# Patient Record
Sex: MALE | Race: WHITE | NOT HISPANIC OR LATINO | URBAN - METROPOLITAN AREA
[De-identification: names, ages, dates, MRNs, and addresses within clinical notes are randomized per-mention and may not be internally consistent; named-entity substitution may affect disease eponyms.]

---

## 2017-08-24 ENCOUNTER — COMMUNICATION - HEALTHEAST (OUTPATIENT)
Dept: INTERNAL MEDICINE | Facility: CLINIC | Age: 47
End: 2017-08-24

## 2017-08-25 ENCOUNTER — RECORDS - HEALTHEAST (OUTPATIENT)
Dept: ADMINISTRATIVE | Facility: OTHER | Age: 47
End: 2017-08-25

## 2017-08-25 ENCOUNTER — COMMUNICATION - HEALTHEAST (OUTPATIENT)
Dept: INTERNAL MEDICINE | Facility: CLINIC | Age: 47
End: 2017-08-25

## 2017-11-22 ENCOUNTER — COMMUNICATION - HEALTHEAST (OUTPATIENT)
Dept: INTERNAL MEDICINE | Facility: CLINIC | Age: 47
End: 2017-11-22

## 2017-11-27 ENCOUNTER — COMMUNICATION - HEALTHEAST (OUTPATIENT)
Dept: INTERNAL MEDICINE | Facility: CLINIC | Age: 47
End: 2017-11-27

## 2017-11-29 ENCOUNTER — COMMUNICATION - HEALTHEAST (OUTPATIENT)
Dept: INTERNAL MEDICINE | Facility: CLINIC | Age: 47
End: 2017-11-29

## 2017-12-05 ENCOUNTER — COMMUNICATION - HEALTHEAST (OUTPATIENT)
Dept: INTERNAL MEDICINE | Facility: CLINIC | Age: 47
End: 2017-12-05

## 2017-12-05 DIAGNOSIS — I10 ESSENTIAL HYPERTENSION: ICD-10-CM

## 2018-04-06 ENCOUNTER — OFFICE VISIT - HEALTHEAST (OUTPATIENT)
Dept: FAMILY MEDICINE | Facility: CLINIC | Age: 48
End: 2018-04-06

## 2018-04-06 DIAGNOSIS — Z00.00 ROUTINE GENERAL MEDICAL EXAMINATION AT A HEALTH CARE FACILITY: ICD-10-CM

## 2018-04-06 DIAGNOSIS — M72.2 PLANTAR FASCIITIS: ICD-10-CM

## 2018-04-06 DIAGNOSIS — I10 ESSENTIAL HYPERTENSION: ICD-10-CM

## 2018-04-06 DIAGNOSIS — F43.10 PTSD (POST-TRAUMATIC STRESS DISORDER): ICD-10-CM

## 2018-04-06 DIAGNOSIS — G47.33 SLEEP APNEA, OBSTRUCTIVE: ICD-10-CM

## 2018-04-06 DIAGNOSIS — L91.8 MULTIPLE ACQUIRED SKIN TAGS: ICD-10-CM

## 2018-04-06 DIAGNOSIS — E78.5 HYPERLIPIDEMIA: ICD-10-CM

## 2018-04-06 RX ORDER — POTASSIUM CHLORIDE 1500 MG/1
20 TABLET, EXTENDED RELEASE ORAL DAILY
Qty: 90 TABLET | Refills: 3 | Status: SHIPPED | OUTPATIENT
Start: 2018-04-06

## 2018-04-06 RX ORDER — ASPIRIN 325 MG
325 TABLET ORAL DAILY
Status: SHIPPED | COMMUNITY
Start: 2018-04-06

## 2018-04-06 RX ORDER — BUPROPION HYDROCHLORIDE 150 MG/1
150 TABLET ORAL EVERY MORNING
Qty: 90 TABLET | Refills: 3 | Status: SHIPPED | OUTPATIENT
Start: 2018-04-06

## 2018-04-06 ASSESSMENT — MIFFLIN-ST. JEOR: SCORE: 2098.9

## 2018-04-06 NOTE — ASSESSMENT & PLAN NOTE
Well-controlled.  Continue losartan daily.  The patient uses Lasix 40 mg as needed for swelling.  I have recommended against as needed use of a diuretic due to electrolyte concerns.  We will change to 40 mg daily.  He reports that he has needed potassium 20 mEq daily when he takes his Lasix so that was started as well.  Return for fasting labs in 1 week.

## 2018-04-06 NOTE — ASSESSMENT & PLAN NOTE
Reviewed the importance of using his CPAP.  He reports he just does not tolerate it and will not use it.  Recommended follow-up with the sleep medicine provider but he has refused that at this time.

## 2018-04-10 ENCOUNTER — AMBULATORY - HEALTHEAST (OUTPATIENT)
Dept: FAMILY MEDICINE | Facility: CLINIC | Age: 48
End: 2018-04-10

## 2018-04-10 DIAGNOSIS — L91.8 SKIN TAGS, MULTIPLE ACQUIRED: ICD-10-CM

## 2018-04-10 ASSESSMENT — MIFFLIN-ST. JEOR: SCORE: 2098.9

## 2018-04-16 LAB
LAB AP CHARGES (HE HISTORICAL CONVERSION): NORMAL
PATH REPORT.COMMENTS IMP SPEC: NORMAL
PATH REPORT.COMMENTS IMP SPEC: NORMAL
PATH REPORT.FINAL DX SPEC: NORMAL
PATH REPORT.GROSS SPEC: NORMAL
PATH REPORT.MICROSCOPIC SPEC OTHER STN: NORMAL
PATH REPORT.RELEVANT HX SPEC: NORMAL
RESULT FLAG (HE HISTORICAL CONVERSION): NORMAL

## 2018-08-28 ENCOUNTER — AMBULATORY - HEALTHEAST (OUTPATIENT)
Dept: LAB | Facility: CLINIC | Age: 48
End: 2018-08-28

## 2018-08-28 DIAGNOSIS — E78.5 HYPERLIPIDEMIA: ICD-10-CM

## 2018-08-28 DIAGNOSIS — I10 ESSENTIAL HYPERTENSION: ICD-10-CM

## 2018-08-29 ENCOUNTER — AMBULATORY - HEALTHEAST (OUTPATIENT)
Dept: LAB | Facility: CLINIC | Age: 48
End: 2018-08-29

## 2018-08-29 DIAGNOSIS — E78.5 HYPERLIPIDEMIA: ICD-10-CM

## 2018-08-29 DIAGNOSIS — R53.83 FATIGUE: ICD-10-CM

## 2018-08-29 DIAGNOSIS — I10 ESSENTIAL HYPERTENSION: ICD-10-CM

## 2018-08-29 LAB
ALBUMIN SERPL-MCNC: 3.7 G/DL (ref 3.5–5)
ALP SERPL-CCNC: 66 U/L (ref 45–120)
ALT SERPL W P-5'-P-CCNC: 51 U/L (ref 0–45)
ANION GAP SERPL CALCULATED.3IONS-SCNC: 10 MMOL/L (ref 5–18)
AST SERPL W P-5'-P-CCNC: 44 U/L (ref 0–40)
BILIRUB SERPL-MCNC: 1.8 MG/DL (ref 0–1)
BUN SERPL-MCNC: 14 MG/DL (ref 8–22)
CALCIUM SERPL-MCNC: 9.4 MG/DL (ref 8.5–10.5)
CHLORIDE BLD-SCNC: 108 MMOL/L (ref 98–107)
CHOLEST SERPL-MCNC: 227 MG/DL
CO2 SERPL-SCNC: 21 MMOL/L (ref 22–31)
CREAT SERPL-MCNC: 0.79 MG/DL (ref 0.7–1.3)
FASTING STATUS PATIENT QL REPORTED: YES
GFR SERPL CREATININE-BSD FRML MDRD: >60 ML/MIN/1.73M2
GLUCOSE BLD-MCNC: 119 MG/DL (ref 70–125)
HBA1C MFR BLD: 6.2 % (ref 3.5–6)
HDLC SERPL-MCNC: 40 MG/DL
LDLC SERPL CALC-MCNC: 142 MG/DL
POTASSIUM BLD-SCNC: 4.2 MMOL/L (ref 3.5–5)
PROT SERPL-MCNC: 7.1 G/DL (ref 6–8)
SODIUM SERPL-SCNC: 139 MMOL/L (ref 136–145)
TRIGL SERPL-MCNC: 226 MG/DL

## 2018-08-30 ENCOUNTER — COMMUNICATION - HEALTHEAST (OUTPATIENT)
Dept: FAMILY MEDICINE | Facility: CLINIC | Age: 48
End: 2018-08-30

## 2018-08-30 DIAGNOSIS — R53.83 FATIGUE: ICD-10-CM

## 2018-09-03 LAB — TESTOST SERPL-MCNC: 143 NG/DL (ref 240–871)

## 2018-09-04 ENCOUNTER — COMMUNICATION - HEALTHEAST (OUTPATIENT)
Dept: FAMILY MEDICINE | Facility: CLINIC | Age: 48
End: 2018-09-04

## 2018-09-04 DIAGNOSIS — R79.89 LOW SERUM TESTOSTERONE LEVEL IN MALE: ICD-10-CM

## 2018-09-04 DIAGNOSIS — R79.89 LOW TESTOSTERONE IN MALE: ICD-10-CM

## 2018-09-05 ENCOUNTER — COMMUNICATION - HEALTHEAST (OUTPATIENT)
Dept: ADMINISTRATIVE | Facility: CLINIC | Age: 48
End: 2018-09-05

## 2018-09-10 ENCOUNTER — COMMUNICATION - HEALTHEAST (OUTPATIENT)
Dept: FAMILY MEDICINE | Facility: CLINIC | Age: 48
End: 2018-09-10

## 2018-09-14 ENCOUNTER — OFFICE VISIT - HEALTHEAST (OUTPATIENT)
Dept: FAMILY MEDICINE | Facility: CLINIC | Age: 48
End: 2018-09-14

## 2018-09-14 DIAGNOSIS — R79.89 LOW TESTOSTERONE IN MALE: ICD-10-CM

## 2018-09-14 DIAGNOSIS — I10 ESSENTIAL HYPERTENSION: ICD-10-CM

## 2018-09-14 DIAGNOSIS — E78.2 MIXED HYPERLIPIDEMIA: ICD-10-CM

## 2018-09-14 DIAGNOSIS — R74.8 ELEVATED LIVER ENZYMES: ICD-10-CM

## 2018-09-14 DIAGNOSIS — E66.01 MORBID OBESITY (H): ICD-10-CM

## 2018-09-14 DIAGNOSIS — R73.03 PREDIABETES: ICD-10-CM

## 2018-09-14 ASSESSMENT — MIFFLIN-ST. JEOR: SCORE: 2103.44

## 2018-09-14 NOTE — ASSESSMENT & PLAN NOTE
Blood pressure have been well controlled at home. Pressure was borderline when he first arrived today and high after discussion of normal labs. I suspect part of this is related to the stress of our discussion. Continue current treatment at this time. The patient is working hard on weight loss, exercise, and diet changes. Recheck in 3 months.

## 2018-09-14 NOTE — ASSESSMENT & PLAN NOTE
The patient has been referred to endocrinology for evaluation. We discussed that some of the lifestyle changes he is already working on may also improve his testosterone levels.

## 2018-09-14 NOTE — ASSESSMENT & PLAN NOTE
ASCVD 10 year risk is 9%. This was reviewed with patient and statin was recommended. Patient is refusing medications at this time. He is going to continue with lifestyle and return for fasting labs/office visit in 3 months.

## 2018-09-17 ENCOUNTER — HOSPITAL ENCOUNTER (OUTPATIENT)
Dept: ULTRASOUND IMAGING | Facility: CLINIC | Age: 48
Discharge: HOME | End: 2018-09-17
Attending: FAMILY MEDICINE

## 2018-09-17 DIAGNOSIS — R74.8 ELEVATED LIVER ENZYMES: ICD-10-CM

## 2018-12-03 ENCOUNTER — COMMUNICATION - HEALTHEAST (OUTPATIENT)
Dept: FAMILY MEDICINE | Facility: CLINIC | Age: 48
End: 2018-12-03

## 2018-12-03 DIAGNOSIS — L30.9 ECZEMA, UNSPECIFIED TYPE: ICD-10-CM

## 2018-12-03 RX ORDER — TRIAMCINOLONE ACETONIDE 0.25 MG/G
CREAM TOPICAL
Qty: 30 G | Refills: 1 | Status: SHIPPED | OUTPATIENT
Start: 2018-12-03

## 2018-12-13 ENCOUNTER — AMBULATORY - HEALTHEAST (OUTPATIENT)
Dept: LAB | Facility: CLINIC | Age: 48
End: 2018-12-13

## 2018-12-13 DIAGNOSIS — R73.03 PREDIABETES: ICD-10-CM

## 2018-12-13 DIAGNOSIS — R79.89 LOW TESTOSTERONE IN MALE: ICD-10-CM

## 2018-12-13 LAB
ALBUMIN SERPL-MCNC: 3.8 G/DL (ref 3.5–5)
ALP SERPL-CCNC: 67 U/L (ref 45–120)
ALT SERPL W P-5'-P-CCNC: 27 U/L (ref 0–45)
ANION GAP SERPL CALCULATED.3IONS-SCNC: 9 MMOL/L (ref 5–18)
AST SERPL W P-5'-P-CCNC: 30 U/L (ref 0–40)
BILIRUB SERPL-MCNC: 1.2 MG/DL (ref 0–1)
BUN SERPL-MCNC: 24 MG/DL (ref 8–22)
CALCIUM SERPL-MCNC: 9.4 MG/DL (ref 8.5–10.5)
CHLORIDE BLD-SCNC: 107 MMOL/L (ref 98–107)
CHOLEST SERPL-MCNC: 192 MG/DL
CO2 SERPL-SCNC: 26 MMOL/L (ref 22–31)
CREAT SERPL-MCNC: 0.95 MG/DL (ref 0.7–1.3)
FASTING STATUS PATIENT QL REPORTED: YES
GFR SERPL CREATININE-BSD FRML MDRD: >60 ML/MIN/1.73M2
GLUCOSE BLD-MCNC: 116 MG/DL (ref 70–125)
HBA1C MFR BLD: 5.7 % (ref 3.5–6)
HDLC SERPL-MCNC: 35 MG/DL
LDLC SERPL CALC-MCNC: 102 MG/DL
POTASSIUM BLD-SCNC: 3.9 MMOL/L (ref 3.5–5)
PROT SERPL-MCNC: 6.7 G/DL (ref 6–8)
SODIUM SERPL-SCNC: 142 MMOL/L (ref 136–145)
TRIGL SERPL-MCNC: 273 MG/DL

## 2018-12-14 LAB — TESTOST SERPL-MCNC: 216 NG/DL (ref 240–871)

## 2018-12-20 ENCOUNTER — OFFICE VISIT - HEALTHEAST (OUTPATIENT)
Dept: FAMILY MEDICINE | Facility: CLINIC | Age: 48
End: 2018-12-20

## 2018-12-20 DIAGNOSIS — E66.01 MORBID OBESITY (H): ICD-10-CM

## 2018-12-20 DIAGNOSIS — I10 ESSENTIAL HYPERTENSION: ICD-10-CM

## 2018-12-20 DIAGNOSIS — R79.89 LOW TESTOSTERONE IN MALE: ICD-10-CM

## 2018-12-20 DIAGNOSIS — E78.2 MIXED HYPERLIPIDEMIA: ICD-10-CM

## 2018-12-20 DIAGNOSIS — R73.03 PREDIABETES: ICD-10-CM

## 2018-12-20 NOTE — ASSESSMENT & PLAN NOTE
The patient has had significant improvement with weight loss and exercise.  Still slightly low but nearing normal.  He will continue with current life style changes.

## 2018-12-20 NOTE — ASSESSMENT & PLAN NOTE
ASCVD 10-year risk: 4%.  This is down from 9% at his last visit with no medications.  He will continue with current lifestyle changes.  Recheck in 1 year.

## 2019-01-25 ENCOUNTER — OFFICE VISIT - HEALTHEAST (OUTPATIENT)
Dept: ENDOCRINOLOGY | Facility: CLINIC | Age: 49
End: 2019-01-25

## 2019-01-25 DIAGNOSIS — E66.811 CLASS 1 OBESITY DUE TO EXCESS CALORIES WITH BODY MASS INDEX (BMI) OF 34.0 TO 34.9 IN ADULT, UNSPECIFIED WHETHER SERIOUS COMORBIDITY PRESENT: ICD-10-CM

## 2019-01-25 DIAGNOSIS — E66.09 CLASS 1 OBESITY DUE TO EXCESS CALORIES WITH BODY MASS INDEX (BMI) OF 34.0 TO 34.9 IN ADULT, UNSPECIFIED WHETHER SERIOUS COMORBIDITY PRESENT: ICD-10-CM

## 2019-01-25 DIAGNOSIS — R79.89 LOW TESTOSTERONE IN MALE: ICD-10-CM

## 2019-01-25 ASSESSMENT — MIFFLIN-ST. JEOR: SCORE: 1996.39

## 2019-07-18 ENCOUNTER — COMMUNICATION - HEALTHEAST (OUTPATIENT)
Dept: FAMILY MEDICINE | Facility: CLINIC | Age: 49
End: 2019-07-18

## 2019-07-22 ENCOUNTER — OFFICE VISIT - HEALTHEAST (OUTPATIENT)
Dept: FAMILY MEDICINE | Facility: CLINIC | Age: 49
End: 2019-07-22

## 2019-07-22 DIAGNOSIS — G89.29 CHRONIC RIGHT SHOULDER PAIN: ICD-10-CM

## 2019-07-22 DIAGNOSIS — M25.511 CHRONIC RIGHT SHOULDER PAIN: ICD-10-CM

## 2019-07-22 DIAGNOSIS — Z01.818 PRE-OP EXAM: ICD-10-CM

## 2019-07-22 DIAGNOSIS — I10 ESSENTIAL HYPERTENSION: ICD-10-CM

## 2019-07-22 DIAGNOSIS — R73.09 ELEVATED GLUCOSE: ICD-10-CM

## 2019-07-22 LAB
ANION GAP SERPL CALCULATED.3IONS-SCNC: 16 MMOL/L (ref 5–18)
ATRIAL RATE - MUSE: 85 BPM
BUN SERPL-MCNC: 15 MG/DL (ref 8–22)
CALCIUM SERPL-MCNC: 9.8 MG/DL (ref 8.5–10.5)
CHLORIDE BLD-SCNC: 105 MMOL/L (ref 98–107)
CO2 SERPL-SCNC: 18 MMOL/L (ref 22–31)
CREAT SERPL-MCNC: 0.85 MG/DL (ref 0.7–1.3)
DIASTOLIC BLOOD PRESSURE - MUSE: 104 MMHG
ERYTHROCYTE [DISTWIDTH] IN BLOOD BY AUTOMATED COUNT: 13 % (ref 11–14.5)
GFR SERPL CREATININE-BSD FRML MDRD: >60 ML/MIN/1.73M2
GLUCOSE BLD-MCNC: 102 MG/DL (ref 70–125)
HBA1C MFR BLD: 5.9 % (ref 3.5–6)
HCT VFR BLD AUTO: 52.4 % (ref 40–54)
HGB BLD-MCNC: 17.7 G/DL (ref 14–18)
INTERPRETATION ECG - MUSE: NORMAL
MCH RBC QN AUTO: 28.6 PG (ref 27–34)
MCHC RBC AUTO-ENTMCNC: 33.8 G/DL (ref 32–36)
MCV RBC AUTO: 85 FL (ref 80–100)
P AXIS - MUSE: 28 DEGREES
PLATELET # BLD AUTO: 154 THOU/UL (ref 140–440)
PMV BLD AUTO: 7.6 FL (ref 7–10)
POTASSIUM BLD-SCNC: 4 MMOL/L (ref 3.5–5)
PR INTERVAL - MUSE: 194 MS
QRS DURATION - MUSE: 88 MS
QT - MUSE: 394 MS
QTC - MUSE: 468 MS
R AXIS - MUSE: 0 DEGREES
RBC # BLD AUTO: 6.2 MILL/UL (ref 4.4–6.2)
SODIUM SERPL-SCNC: 139 MMOL/L (ref 136–145)
SYSTOLIC BLOOD PRESSURE - MUSE: 142 MMHG
T AXIS - MUSE: 33 DEGREES
VENTRICULAR RATE- MUSE: 85 BPM
WBC: 9 THOU/UL (ref 4–11)

## 2019-07-22 RX ORDER — KETOROLAC TROMETHAMINE 10 MG/1
1 TABLET, FILM COATED ORAL PRN
Refills: 0 | Status: SHIPPED | COMMUNITY
Start: 2019-04-12

## 2019-07-22 ASSESSMENT — MIFFLIN-ST. JEOR: SCORE: 2034.04

## 2019-07-22 NOTE — ASSESSMENT & PLAN NOTE
Blood pressure not fully controlled below goal of 140/90.  It is below 160/100 on recheck.  Add in metoprolol at 25 mg daily.  If blood pressure still not below goal of 140/90 in 3 days increase to twice a day.  Follow-up in clinic 1 week after surgery, or 2 to 3 weeks from today for blood pressure recheck.  Of note, blood pressure was well controlled with exercise.  With current limitations and exercise blood pressure has elevated.  Very likely that in 1 to 2 months once patient is back to exercise that will be able to come back off the metoprolol.

## 2019-10-23 ENCOUNTER — COMMUNICATION - HEALTHEAST (OUTPATIENT)
Dept: FAMILY MEDICINE | Facility: CLINIC | Age: 49
End: 2019-10-23

## 2019-10-23 DIAGNOSIS — I10 ESSENTIAL HYPERTENSION: ICD-10-CM

## 2020-01-02 ENCOUNTER — COMMUNICATION - HEALTHEAST (OUTPATIENT)
Dept: FAMILY MEDICINE | Facility: CLINIC | Age: 50
End: 2020-01-02

## 2020-05-18 ENCOUNTER — RECORDS - HEALTHEAST (OUTPATIENT)
Dept: LAB | Facility: CLINIC | Age: 50
End: 2020-05-18

## 2020-05-18 LAB
APPEARANCE FLD: ABNORMAL
COLOR FLD: YELLOW
CRYSTALS SNV MICRO: NORMAL
EOSINOPHIL NFR FLD MANUAL: ABNORMAL %
LYMPHOCYTES NFR FLD MANUAL: 5 %
MACROPHAGE % - HISTORICAL: ABNORMAL
MESOTHELIALS, FLUID: ABNORMAL
MONOCYTE % - HISTORICAL: 17 %
NEUTS BAND NFR FLD MANUAL: 78 %
OTHER CELLS FLD MANUAL: ABNORMAL
RBC FLUID - HISTORICAL: ABNORMAL /UL
WBC # FLD AUTO: 3040 /UL (ref 0–99)

## 2020-05-21 LAB
BACTERIA SPEC CULT: NO GROWTH
BACTERIA SPEC CULT: NORMAL
GRAM STAIN RESULT: NORMAL
GRAM STAIN RESULT: NORMAL

## 2020-12-01 ENCOUNTER — COMMUNICATION - HEALTHEAST (OUTPATIENT)
Dept: FAMILY MEDICINE | Facility: CLINIC | Age: 50
End: 2020-12-01

## 2020-12-01 DIAGNOSIS — I10 ESSENTIAL HYPERTENSION: ICD-10-CM

## 2020-12-01 RX ORDER — LOSARTAN POTASSIUM 100 MG/1
TABLET ORAL
Qty: 30 TABLET | Refills: 0 | Status: SHIPPED | OUTPATIENT
Start: 2020-12-01

## 2020-12-01 RX ORDER — FUROSEMIDE 40 MG
TABLET ORAL
Qty: 30 TABLET | Refills: 0 | Status: SHIPPED | OUTPATIENT
Start: 2020-12-01

## 2020-12-03 ENCOUNTER — COMMUNICATION - HEALTHEAST (OUTPATIENT)
Dept: FAMILY MEDICINE | Facility: CLINIC | Age: 50
End: 2020-12-03

## 2020-12-11 ENCOUNTER — OFFICE VISIT - HEALTHEAST (OUTPATIENT)
Dept: FAMILY MEDICINE | Facility: CLINIC | Age: 50
End: 2020-12-11

## 2020-12-11 DIAGNOSIS — Z53.21 PROCEDURE AND TREATMENT NOT CARRIED OUT DUE TO PATIENT LEAVING PRIOR TO BEING SEEN BY HEALTH CARE PROVIDER: ICD-10-CM

## 2020-12-11 ASSESSMENT — MIFFLIN-ST. JEOR: SCORE: 2185.66

## 2021-04-22 ENCOUNTER — RECORDS - HEALTHEAST (OUTPATIENT)
Dept: ADMINISTRATIVE | Facility: OTHER | Age: 51
End: 2021-04-22

## 2021-04-22 LAB
ALT SERPL W/O P-5'-P-CCNC: 17 U/L (ref 9–46)
AST SERPL-CCNC: 21 U/L (ref 10–35)
CREAT SERPL-MCNC: 0.8 MG/DL (ref 0.7–1.33)
GFR ESTIMATE EXT - HISTORICAL: 104 ML/MIN/1.73M2
GFR ESTIMATE, IF BLACK EXT - HISTORICAL: 121 ML/MIN/1.73M2

## 2021-05-18 ENCOUNTER — RECORDS - HEALTHEAST (OUTPATIENT)
Dept: HEALTH INFORMATION MANAGEMENT | Facility: CLINIC | Age: 51
End: 2021-05-18

## 2021-05-19 ENCOUNTER — RECORDS - HEALTHEAST (OUTPATIENT)
Dept: LAB | Facility: HOSPITAL | Age: 51
End: 2021-05-19

## 2021-05-19 LAB
C REACTIVE PROTEIN LHE: 0.8 MG/DL (ref 0–0.8)
ERYTHROCYTE [SEDIMENTATION RATE] IN BLOOD BY WESTERGREN METHOD: 2 MM/HR (ref 0–15)

## 2021-05-28 ENCOUNTER — RECORDS - HEALTHEAST (OUTPATIENT)
Dept: ADMINISTRATIVE | Facility: CLINIC | Age: 51
End: 2021-05-28

## 2021-05-29 ENCOUNTER — RECORDS - HEALTHEAST (OUTPATIENT)
Dept: ADMINISTRATIVE | Facility: CLINIC | Age: 51
End: 2021-05-29

## 2021-05-30 NOTE — PATIENT INSTRUCTIONS - HE
Hold all supplements, aspirin and NSAIDs for 7 days prior to surgery.  Follow your surgeon's direction on when to stop eating and drinking prior to surgery.  Your surgeon will be managing your pain after your surgery.    Remove all jewelry and metal piercings before your surgery.   Remove nail polish from fingers before surgery.  Day of surgery hold all medications except metoprolol which you should take with a small sip of water.    Start metoprolol at 1 tab in the morning daily. After 3 days, if morning blood pressure is still above 140/90, then increase to 1 tab twice a day.

## 2021-05-30 NOTE — PROGRESS NOTES
Preoperative Exam    Scheduled Procedure: Right Shoulder Arthroscopy  Surgery Date:  7/31/19  Surgery [facility], fax [phone]    Surgeon:  Dr. Rodriguez    Assessment/Plan:     Problem List Items Addressed This Visit     Essential hypertension     Blood pressure not fully controlled below goal of 140/90.  It is below 160/100 on recheck.  Add in metoprolol at 25 mg daily.  If blood pressure still not below goal of 140/90 in 3 days increase to twice a day.  Follow-up in clinic 1 week after surgery, or 2 to 3 weeks from today for blood pressure recheck.  Of note, blood pressure was well controlled with exercise.  With current limitations and exercise blood pressure has elevated.  Very likely that in 1 to 2 months once patient is back to exercise that will be able to come back off the metoprolol.         Relevant Medications    metoprolol succinate (TOPROL XL) 25 MG    Other Relevant Orders    Basic Metabolic Panel (Completed)      Other Visit Diagnoses     Pre-op exam    -  Primary    Medically cleared for planned surgery    Relevant Orders    Electrocardiogram Perform and Read (Completed)    Chronic right shoulder pain        Relevant Orders    HM2(CBC w/o Differential) (Completed)    Elevated glucose        Given his history of elevated glucose and not fasting today.  Will check A1c with labs.    Relevant Orders    Glycosylated Hemoglobin A1c (Completed)            Surgical Procedure Risk: Intermediate (reported cardiac risk generally 1-5%)  Have you had prior anesthesia?: Yes  Have you or any family members had a previous anesthesia reaction:  No  Do you or any family members have a history of a clotting or bleeding disorder?: Yes:  Mother who is an alcoholic.  Patient has had no difficulties with clotting previously  Cardiac Risk Assessment: no increased risk for major cardiac complications    APPROVAL GIVEN to proceed with proposed procedure, without further  diagnostic evaluation    Please Note:  Patient uses a CPAP machine.    Functional Status: Independent  Patient plans to recover at home with family.     Subjective:      Barrera Flores is a 48 y.o. male who presents for a preoperative consultation.  In March patient was playing catch with his son.  And catching a ball he felt sudden pain in the anterior portion of his shoulder.  He did go to orthopedics the next day and went through physical therapy as well as injections with out full recovery or function of the shoulder.  As such surgical intervention was planned as per orthopedic notes.  With pain in his shoulder patient has had limited exercise capabilities.  With that he has seen a slow increase in his blood pressure over the past 3 months.    All other systems reviewed and are negative, other than those listed in the HPI.    Pertinent History  Do you have difficulty breathing or chest pain after walking up a flight of stairs: No  History of obstructive sleep apnea: Yes: On CPAP.  Steroid use in the last 6 months: No  Frequent Aspirin/NSAID use: No  Prior Blood Transfusion: No  Prior Blood Transfusion Reaction: No  If for some reason prior to, during or after the procedure, if it is medically indicated, would you be willing to have a blood transfusion?:  There is no transfusion refusal.    Current Outpatient Medications   Medication Sig Dispense Refill     buPROPion (WELLBUTRIN XL) 150 MG 24 hr tablet Take 1 tablet (150 mg total) by mouth every morning. 90 tablet 3     cholecalciferol, vitamin D3, 5,000 unit Tab Take 5,000 Units by mouth.       furosemide (LASIX) 40 MG tablet Take 1 tablet (40 mg total) by mouth daily. 90 tablet 3     losartan (COZAAR) 100 MG tablet Take 1 tablet (100 mg total) by mouth daily. TAKE 1 TABLET BY MOUTH DAILY 90 tablet 3     omeprazole (PRILOSEC) 20 MG capsule Take 1 capsule (20 mg total) by mouth daily. one pill once daily 90 capsule 1     potassium chloride SA (K-DUR,KLOR-CON)  "20 MEQ tablet Take 1 tablet (20 mEq total) by mouth daily. 90 tablet 3     triamcinolone (KENALOG) 0.025 % cream APPLY TOPICALLY 3 TIMES DAILY AS NEEDED 30 g 1     aspirin 325 MG tablet Take 325 mg by mouth daily.       ketorolac (TORADOL) 10 mg tablet Take 1 tablet by mouth as needed.  0     metoprolol succinate (TOPROL XL) 25 MG Take 1 tablet (25 mg total) by mouth 2 (two) times a day. 60 tablet 2     No current facility-administered medications for this visit.         Allergies   Allergen Reactions     Quetiapine Other (See Comments)     Pancreatitis   Pancreatitis       Benzodiazepines Other (See Comments)     Lethargy and confusion. \"Blah\"      Diazepam Unknown     Lethargy and confusion. \"Blah\"      Quetiapine Fumarate Other (See Comments)     Titanium Other (See Comments)     Caused severe pain, discomfort.      Allopurinol Rash       Patient Active Problem List   Diagnosis     Chronic Gout     Mixed hyperlipidemia     Reflex Sympathetic Dystrophy     Osteoarthritis of both hips     Essential hypertension     Sleep apnea, obstructive     Kidney stone     PTSD (post-traumatic stress disorder)     Low testosterone in male     Hepatic steatosis     Obesity (BMI 35.0-39.9) with comorbidity (H)     Eczema       Past Medical History:   Diagnosis Date     Chronic Gout      Eczema 12/3/2018     Essential hypertension 8/19/2016     Hepatic steatosis 9/21/2018     Low testosterone in male 9/5/2018    Patient evaluated by endocrine. LH,FSH pending. Suspected due to weight. Continued life style changes and weight loss recommended.     Mixed hyperlipidemia      Osteoarthritis of both hips 12/1/2015     Prediabetes 9/25/2018     PTSD (post-traumatic stress disorder) 4/6/2018     Reflex sympathetic dystrophy     Created by Conversion  Replacement Utility updated for latest IMO load     Sleep apnea, obstructive 8/19/2016    Overview: Patient doesn't tolerate CPAP well.         Past Surgical History:   Procedure Laterality " Date     COLONOSCOPY  2016     KNEE ARTHROPLASTY       OTHER SURGICAL HISTORY Right 09/28/2015    3cm lipoma excision from right wrist      [place] ARTHRODESIS ANT INTERBODY MIN DISCECTOMY,LUMBAR      Description: Lumbar Vertebral Fusion;  Proc Date: 04/23/1995;  Comments: L1-L2     [place] REMOVAL GALLBLADDER      Description: Cholecystectomy;  Recorded: 10/24/2012;     [place] SYMPATHECTOMY,LUMBAR      Description: Surgical Sympathectomy Lumbar;  Recorded: 10/24/2012;     TOTAL HIP ARTHROPLASTY Bilateral      UMBILICAL HERNIA REPAIR      Herniorrhaphy     WRIST FUSION Left 2003       Social History     Socioeconomic History     Marital status:      Spouse name: Vilma     Number of children: 2     Years of education: 14     Highest education level: Associate degree: occupational, technical, or vocational program   Occupational History     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     Employer: NOT EMPLOYED   Social Needs     Financial resource strain: Not on file     Food insecurity:     Worry: Not on file     Inability: Not on file     Transportation needs:     Medical: Not on file     Non-medical: Not on file   Tobacco Use     Smoking status: Never Smoker     Smokeless tobacco: Never Used   Substance and Sexual Activity     Alcohol use: Yes     Frequency: Monthly or less     Binge frequency: Never     Comment: Rarely     Drug use: No     Comment: Previous Marijuana use     Sexual activity: Yes     Partners: Female     Birth control/protection: None   Lifestyle     Physical activity:     Days per week: Not on file     Minutes per session: Not on file     Stress: Not on file   Relationships     Social connections:     Talks on phone: Not on file     Gets together: Not on file     Attends Bahai service: Not on file     Active member of club or organization: Not on file     Attends meetings of clubs or organizations: Not on file     Relationship status: Not on file     Intimate partner violence:     Fear of current or ex partner:  "Not on file     Emotionally abused: Not on file     Physically abused: Not on file     Forced sexual activity: Not on file   Other Topics Concern     Not on file   Social History Narrative     Not on file         Objective:     Vitals:    07/22/19 1017 07/22/19 1103   BP: (!) 142/104 (!) 144/98   Pulse: 88    Resp: 16    Temp: 98.3  F (36.8  C)    TempSrc: Oral    Weight: (!) 262 lb 11.2 oz (119.2 kg)    Height: 5' 8.5\" (1.74 m)          Physical Exam:  Physical Exam   Constitutional: He is oriented to person, place, and time. He appears well-developed and well-nourished.   HENT:   Head: Normocephalic and atraumatic.   Nose: Nose normal.   Mouth/Throat: Oropharynx is clear and moist.   Eyes: Conjunctivae and EOM are normal.   Neck: No thyromegaly present.   Cardiovascular: Normal rate, regular rhythm, normal heart sounds and intact distal pulses. Exam reveals no gallop and no friction rub.   No murmur heard.  Pulmonary/Chest: Effort normal and breath sounds normal. He has no wheezes. He has no rales.   Abdominal: Soft. Bowel sounds are normal.   Musculoskeletal: He exhibits no edema.   Lymphadenopathy:     He has no cervical adenopathy.   Neurological: He is alert and oriented to person, place, and time. He has normal strength and normal reflexes. No cranial nerve deficit or sensory deficit.   Reflex Scores:       Bicep reflexes are 2+ on the right side and 2+ on the left side.       Brachioradialis reflexes are 2+ on the right side and 2+ on the left side.       Patellar reflexes are 2+ on the right side and 2+ on the left side.       Achilles reflexes are 2+ on the right side and 2+ on the left side.  Skin: Skin is warm and dry. Capillary refill takes less than 2 seconds.   Psychiatric: He has a normal mood and affect.   Nursing note and vitals reviewed.       Patient Instructions     Hold all supplements, aspirin and NSAIDs for 7 days prior to surgery.  Follow your surgeon's direction on when to stop eating and " drinking prior to surgery.  Your surgeon will be managing your pain after your surgery.    Remove all jewelry and metal piercings before your surgery.   Remove nail polish from fingers before surgery.  Day of surgery hold all medications except metoprolol which you should take with a small sip of water.    Start metoprolol at 1 tab in the morning daily. After 3 days, if morning blood pressure is still above 140/90, then increase to 1 tab twice a day.      Labs:  Recent Results (from the past 48 hour(s))   Electrocardiogram Perform and Read    Collection Time: 07/22/19 10:36 AM   Result Value Ref Range    SYSTOLIC BLOOD PRESSURE 142 mmHg    DIASTOLIC BLOOD PRESSURE 104 mmHg    VENTRICULAR RATE 85 BPM    ATRIAL RATE 85 BPM    P-R INTERVAL 194 ms    QRS DURATION 88 ms    Q-T INTERVAL 394 ms    QTC CALCULATION (BEZET) 468 ms    P Axis 28 degrees    R AXIS 0 degrees    T AXIS 33 degrees    MUSE DIAGNOSIS       Normal sinus rhythm  Normal ECG  When compared with ECG of 13-OCT-2016 10:23,  No significant change was found  Confirmed by EMA CONWAY MD LOC: ([id number]) on 7/22/2019 2:07:51 PM     HM2(CBC w/o Differential)    Collection Time: 07/22/19 11:09 AM   Result Value Ref Range    WBC 9.0 4.0 - 11.0 thou/uL    RBC 6.20 4.40 - 6.20 mill/uL    Hemoglobin 17.7 14.0 - 18.0 g/dL    Hematocrit 52.4 40.0 - 54.0 %    MCV 85 80 - 100 fL    MCH 28.6 27.0 - 34.0 pg    MCHC 33.8 32.0 - 36.0 g/dL    RDW 13.0 11.0 - 14.5 %    Platelets 154 140 - 440 thou/uL    MPV 7.6 7.0 - 10.0 fL   Basic Metabolic Panel    Collection Time: 07/22/19 11:09 AM   Result Value Ref Range    Sodium 139 136 - 145 mmol/L    Potassium 4.0 3.5 - 5.0 mmol/L    Chloride 105 98 - 107 mmol/L    CO2 18 (L) 22 - 31 mmol/L    Anion Gap, Calculation 16 5 - 18 mmol/L    Glucose 102 70 - 125 mg/dL    Calcium 9.8 8.5 - 10.5 mg/dL    BUN 15 8 - 22 mg/dL    Creatinine 0.85 0.70 - 1.30 mg/dL    GFR MDRD Af Amer >60 >60 mL/min/1.73m2    GFR MDRD Non Af Amer >60 >60  mL/min/1.73m2   Glycosylated Hemoglobin A1c    Collection Time: 07/22/19 11:09 AM   Result Value Ref Range    Hemoglobin A1c 5.9 3.5 - 6.0 %        Immunization History   Administered Date(s) Administered     Influenza, inj, historic,unspecified 02/19/2007, 11/19/2007, 12/04/2012, 11/07/2013, 09/05/2018     Influenza,seasonal quad, PF, 36+MOS 11/04/2017     Influenza,seasonal, Inj IIV3 02/29/2016     Tdap 02/10/2010, 07/17/2015           Electronically signed by Eder Aguirre DO 07/23/19 10:25 AM

## 2021-05-30 NOTE — TELEPHONE ENCOUNTER
New Appointment Needed  What is the reason for the visit:    Pre-Op Appt Request  When is the surgery? :  07/31/19   Where is the surgery?:   [facility] [place]  Who is the surgeon? :  Doesn't know  What type of surgery is being done?: Shoulder repair  Provider Preference: Any available  How soon do you need to be seen?: Next available  Waitlist offered?: No  Okay to leave a detailed message:  Yes

## 2021-05-31 ENCOUNTER — RECORDS - HEALTHEAST (OUTPATIENT)
Dept: ADMINISTRATIVE | Facility: CLINIC | Age: 51
End: 2021-05-31

## 2021-06-01 VITALS — WEIGHT: 277 LBS | HEIGHT: 69 IN | BODY MASS INDEX: 41.03 KG/M2

## 2021-06-02 ENCOUNTER — RECORDS - HEALTHEAST (OUTPATIENT)
Dept: ADMINISTRATIVE | Facility: CLINIC | Age: 51
End: 2021-06-02

## 2021-06-02 VITALS — WEIGHT: 278 LBS | HEIGHT: 69 IN | BODY MASS INDEX: 41.18 KG/M2

## 2021-06-02 VITALS — WEIGHT: 254.9 LBS | BODY MASS INDEX: 38.19 KG/M2

## 2021-06-02 VITALS — WEIGHT: 254.4 LBS | HEIGHT: 69 IN | BODY MASS INDEX: 37.68 KG/M2

## 2021-06-02 NOTE — TELEPHONE ENCOUNTER
Refill Approved    Rx renewed per Medication Renewal Policy. Medication was last renewed on 4/6/18.    Arianne Bowie, [facility] Triage/Med Refill 10/23/2019     Requested Prescriptions   Pending Prescriptions Disp Refills     furosemide (LASIX) 40 MG tablet [Pharmacy Med Name: FUROSEMIDE 40MG     TAB] 90 tablet 3     Sig: TAKE 1 TABLET BY MOUTH ONCE DAILY       Diuretics/Combination Diuretics Refill Protocol  Passed - 10/23/2019 10:46 AM        Passed - Visit with PCP or prescribing provider visit in past 12 months     Last office visit with prescriber/PCP: 12/20/2018 Candice Conley MD OR same dept: 12/20/2018 Candice Conley MD OR same specialty: 12/20/2018 Candice Conley MD  Last physical: Visit date not found Last MTM visit: Visit date not found   Next visit within 3 mo: Visit date not found  Next physical within 3 mo: Visit date not found  Prescriber OR PCP: Candice Conley MD  Last diagnosis associated with med order: There are no diagnoses linked to this encounter.  If protocol passes may refill for 12 months if within 3 months of last provider visit (or a total of 15 months).             Passed - Serum Potassium in past 12 months      Lab Results   Component Value Date    Potassium 4.0 07/22/2019             Passed - Serum Sodium in past 12 months      Lab Results   Component Value Date    Sodium 139 07/22/2019             Passed - Blood pressure on file in past 12 months     BP Readings from Last 1 Encounters:   07/22/19 (!) 144/98             Passed - Serum Creatinine in past 12 months      Creatinine   Date Value Ref Range Status   07/22/2019 0.85 0.70 - 1.30 mg/dL Final             losartan (COZAAR) 100 MG tablet [Pharmacy Med Name: LOSARTAN 100MG   TAB] 90 tablet 3     Sig: TAKE 1 TABLET BY MOUTH ONCE DAILY       Angiotensin Receptor Blocker Protocol Passed - 10/23/2019 10:46 AM        Passed - PCP or prescribing provider visit in past 12 months       Last office visit with  prescriber/PCP: 12/20/2018 Candice Conley MD OR same dept: 12/20/2018 Candice Conley MD OR same specialty: 12/20/2018 Candice Conley MD  Last physical: Visit date not found Last MTM visit: Visit date not found   Next visit within 3 mo: Visit date not found  Next physical within 3 mo: Visit date not found  Prescriber OR PCP: Candice Conley MD  Last diagnosis associated with med order: There are no diagnoses linked to this encounter.  If protocol passes may refill for 12 months if within 3 months of last provider visit (or a total of 15 months).             Passed - Serum potassium within the past 12 months     Lab Results   Component Value Date    Potassium 4.0 07/22/2019             Passed - Blood pressure filed in past 12 months     BP Readings from Last 1 Encounters:   07/22/19 (!) 144/98             Passed - Serum creatinine within the past 12 months     Creatinine   Date Value Ref Range Status   07/22/2019 0.85 0.70 - 1.30 mg/dL Final

## 2021-06-03 VITALS — BODY MASS INDEX: 38.91 KG/M2 | HEIGHT: 69 IN | WEIGHT: 262.7 LBS

## 2021-06-04 NOTE — TELEPHONE ENCOUNTER
Patient had higher BP and was supposed to be able to get off once exercising again after surgery. See pre-op from 7/22/19. Patient should come in for a BP monitoring visit of some sort, Nurse or Dr. Candice Aviles, Clinic Assistant

## 2021-06-05 VITALS
HEIGHT: 69 IN | RESPIRATION RATE: 16 BRPM | BODY MASS INDEX: 43.6 KG/M2 | TEMPERATURE: 98 F | HEART RATE: 72 BPM | SYSTOLIC BLOOD PRESSURE: 136 MMHG | DIASTOLIC BLOOD PRESSURE: 96 MMHG | WEIGHT: 294.38 LBS

## 2021-06-05 NOTE — TELEPHONE ENCOUNTER
Left message to call back for: Barrera  Information to relay to patient:  See message below and schedule an appointment    Estela Fournier LPN

## 2021-06-13 NOTE — TELEPHONE ENCOUNTER
Patient overdue for clinic visit. Please contact him to schedule either office visit or physical. He is also due for fasting labs which we can discuss at that visit. Refill sent for one month.

## 2021-06-13 NOTE — TELEPHONE ENCOUNTER
RN cannot approve Refill Request    RN can NOT refill this medication Protocol failed and NO refill given. Last office visit: 12/20/2018 Candice Conley MD Last Physical: Visit date not found Last MTM visit: Visit date not found Last visit same specialty: 12/20/2018 Candice Conley MD.  Next visit within 3 mo: Visit date not found  Next physical within 3 mo: Visit date not found      Arianne Bowie, [facility] Triage/Med Refill 12/1/2020    Requested Prescriptions   Pending Prescriptions Disp Refills     losartan (COZAAR) 100 MG tablet [Pharmacy Med Name: Losartan Potassium 100 MG Oral Tablet] 90 tablet 0     Sig: Take 1 tablet by mouth once daily       Angiotensin Receptor Blocker Protocol Failed - 12/1/2020  7:24 AM        Failed - PCP or prescribing provider visit in past 12 months       Last office visit with prescriber/PCP: 12/20/2018 Candice Conley MD OR same dept: Visit date not found OR same specialty: 12/20/2018 Candice Conley MD  Last physical: Visit date not found Last MTM visit: Visit date not found   Next visit within 3 mo: Visit date not found  Next physical within 3 mo: Visit date not found  Prescriber OR PCP: Candice Conley MD  Last diagnosis associated with med order: 1. Essential hypertension  - losartan (COZAAR) 100 MG tablet [Pharmacy Med Name: Losartan Potassium 100 MG Oral Tablet]; TAKE 1 TABLET BY MOUTH ONCE DAILY  Dispense: 90 tablet; Refill: 0  - furosemide (LASIX) 40 MG tablet [Pharmacy Med Name: Furosemide 40 MG Oral Tablet]; TAKE 1 TABLET BY MOUTH ONCE DAILY  Dispense: 90 tablet; Refill: 0    If protocol passes may refill for 12 months if within 3 months of last provider visit (or a total of 15 months).             Failed - Serum potassium within the past 12 months     No results found for: LN-POTASSIUM          Failed - Blood pressure filed in past 12 months     BP Readings from Last 1 Encounters:   07/22/19 (!) 144/98             Failed - Serum creatinine within the  past 12 months     Creatinine   Date Value Ref Range Status   07/22/2019 0.85 0.70 - 1.30 mg/dL Final                furosemide (LASIX) 40 MG tablet [Pharmacy Med Name: Furosemide 40 MG Oral Tablet] 90 tablet 0     Sig: Take 1 tablet by mouth once daily       Diuretics/Combination Diuretics Refill Protocol  Failed - 12/1/2020  7:24 AM        Failed - Visit with PCP or prescribing provider visit in past 12 months     Last office visit with prescriber/PCP: 12/20/2018 Candice Conley MD OR same dept: Visit date not found OR same specialty: 12/20/2018 Candice Conley MD  Last physical: Visit date not found Last MTM visit: Visit date not found   Next visit within 3 mo: Visit date not found  Next physical within 3 mo: Visit date not found  Prescriber OR PCP: Candice Conley MD  Last diagnosis associated with med order: 1. Essential hypertension  - losartan (COZAAR) 100 MG tablet [Pharmacy Med Name: Losartan Potassium 100 MG Oral Tablet]; TAKE 1 TABLET BY MOUTH ONCE DAILY  Dispense: 90 tablet; Refill: 0  - furosemide (LASIX) 40 MG tablet [Pharmacy Med Name: Furosemide 40 MG Oral Tablet]; TAKE 1 TABLET BY MOUTH ONCE DAILY  Dispense: 90 tablet; Refill: 0    If protocol passes may refill for 12 months if within 3 months of last provider visit (or a total of 15 months).             Failed - Serum Potassium in past 12 months      No results found for: LN-POTASSIUM          Failed - Serum Sodium in past 12 months      No results found for: LN-SODIUM          Failed - Blood pressure on file in past 12 months     BP Readings from Last 1 Encounters:   07/22/19 (!) 144/98             Failed - Serum Creatinine in past 12 months      Creatinine   Date Value Ref Range Status   07/22/2019 0.85 0.70 - 1.30 mg/dL Final

## 2021-06-13 NOTE — PROGRESS NOTES
Patient here for a physical and follow up on his chronic medical issues. He refused to put on a mask at the  or when being roomed. I spoke to the patient and asked him to put on a mask which he refused so he was asked to leave the clinic. Addressed his request for a medical exemption from wearing a mask due to PTSD and claustrophobia. Unfortunately for his health as well as the health of others I am not able to provide him with a medical exemption from mask wearing.

## 2021-06-16 PROBLEM — R79.89 LOW TESTOSTERONE IN MALE: Status: ACTIVE | Noted: 2018-09-05

## 2021-06-16 PROBLEM — K76.0 HEPATIC STEATOSIS: Status: ACTIVE | Noted: 2018-09-21

## 2021-06-16 PROBLEM — E66.01 SEVERE OBESITY (BMI 35.0-39.9) WITH COMORBIDITY (H): Status: ACTIVE | Noted: 2018-09-25

## 2021-06-16 PROBLEM — L30.9 ECZEMA: Status: ACTIVE | Noted: 2018-12-03

## 2021-06-16 PROBLEM — F43.10 PTSD (POST-TRAUMATIC STRESS DISORDER): Status: ACTIVE | Noted: 2018-04-06

## 2021-06-17 NOTE — PROGRESS NOTES
PROCEDURE NOTE    PRE-OP DIAGNOSIS:  skin tag    PROCEDURE:  Skin Lesion Excision(s)    INDICATIONS:  Barrera Flores is a 47 y.o. male who presents for removal of multiple skin tags. The patient understands all risks, benefits, indications, potential complications, and alternatives, and freely consents for the procedure.  The patient also understands the option of performing no surgery, the risk for scarring, and the technique of the procedure.  Informed consent was obtained.    EXAM: Multiple benign appearing skin tags: 3 on right eyelid, 1 on left eyelid, 2 in the right axilla, 1 in left axilla, 2 in left groin area.    ANESTHESIA:  Local anesthesia with 1% lidocaine with epinephrine right and left axilla and left groin.  Total: 4 ml.     TECHNIQUE:  The skin was prepped with iodine and draped in the usual sterile fashion. Anesthesia as above.  Skin tags clipped with a sterile scissors: 1 on right eyelid, 2 in right axilla, one in left axilla, and 2 in left groin area.  Remaining skin tags on his eyelids were treated with liquid nitrogen as they were small and not amenable to removal.  Antibiotic ointment and a dressing were applied.  Larger lesion from groin area was sent for pathology.    Barrera tolerated the procedure well and without complications.      EBL: minimal.     The patient will be alert for any signs of cutaneous infection and will follow up as instructed.    Patient Instructions   Leave bandages on until tomorrow.  Starting tomorrow, wash gently with soap and water twice daily and apply antibiotic ointment.    Follow-up if any signs of infection.  We will notify you of pathology results.

## 2021-06-17 NOTE — PROGRESS NOTES
Assessment / Plan:     Problem List Items Addressed This Visit     Essential hypertension     Well-controlled.  Continue losartan daily.  The patient uses Lasix 40 mg as needed for swelling.  I have recommended against as needed use of a diuretic due to electrolyte concerns.  We will change to 40 mg daily.  He reports that he has needed potassium 20 mEq daily when he takes his Lasix so that was started as well.  Return for fasting labs in 1 week.         Relevant Medications    potassium chloride SA (K-DUR,KLOR-CON) 20 MEQ tablet    furosemide (LASIX) 40 MG tablet    Other Relevant Orders    Comprehensive Metabolic Panel    Hyperlipidemia     Patient not currently on any treatment.  Recommended fasting labs and he will return for those.         Relevant Orders    Lipid [place] FASTING    Glycosylated Hemoglobin A1c    PTSD (post-traumatic stress disorder)     Start Wellbutrin 150 mg daily.         Relevant Medications    buPROPion (WELLBUTRIN XL) 150 MG 24 hr tablet    Sleep apnea, obstructive     Reviewed the importance of using his CPAP.  He reports he just does not tolerate it and will not use it.  Recommended follow-up with the sleep medicine provider but he has refused that at this time.           Other Visit Diagnoses     Routine general medical examination at a health care facility    -  Primary    Plantar fasciitis        Relevant Orders    HI SHOE HEEL PAD REMOVABLE FOR    Multiple acquired skin tags   - patient will make appointment for removal.         Patient Instructions   Make an appointment return for fasting labs in approximately 1 week.  Make an appointment for skin tag removal.  Take Lasix and potassium daily instead of as needed.  Start Wellbutrin 150 mg once daily.  No change in other medications.  Wear shoes whenever you are on your feet.  Use heel cups.  Please notify me if you would like to see a podiatrist.  Consider the weight loss program and notify us if you would like to schedule.          Subjective:      Barrera Flores is a 47 y.o. male who presents for an annual exam. The patient reports that there is not domestic violence in his life.     The patient is also complaining of pain in his feet on the heel and in front of the heel for years.  Symptoms have been worse for about three weeks.  Symptoms are worse when he has been sedentary for awhile.  No injury that he knows of.    The patient is also complaining of symptoms of PTSD.  His mood is depressed, he is over eating, and motivation is low.  He has done Wellbutrin in the past which was helpful and he would like to restart on that.  He has not found counseling helpful in the past.    The patient is also complaining of multiple skin tags that he would like removed.    Healthy Habits:   Regular Exercise: Yes  Sunscreen Use: No  Healthy Diet: Yes and No  Dental Visits Regularly: Yes  Seat Belt: Yes  Sexually active: Yes  Monthly Self Testicular Exams:  No  Colonoscopy: Yes  Prevention of Osteoporosis: No  Last Dexa: No  Guns at Home: N/A Prefers not to say.      Immunization History   Administered Date(s) Administered     Influenza, inj, historic,unspecified 02/19/2007, 11/19/2007, 12/04/2012, 11/07/2013     Influenza,seasonal quad, PF, 36+MOS 11/04/2017     Tdap 02/10/2010, 07/17/2015     Immunization status: up to date and documented.    Current Outpatient Prescriptions   Medication Sig Dispense Refill     aspirin 325 MG tablet Take 325 mg by mouth daily.       cholecalciferol, vitamin D3, 5,000 unit Tab Take 5,000 Units by mouth.       losartan (COZAAR) 100 MG tablet Take 1 tablet (100 mg total) by mouth daily. TAKE 1 TABLET BY MOUTH DAILY 90 tablet 3     omeprazole (PRILOSEC) 20 MG capsule Take 1 capsule (20 mg total) by mouth daily. one pill once daily 90 capsule 1     triamcinolone (KENALOG) 0.025 % cream Apply topically 3 (three) times a day as needed. 30 g 0     furosemide (LASIX) 40 MG tablet Take 1 tablet (40 mg total) by mouth daily  as needed. 90 tablet 3     potassium chloride SA (K-DUR,KLOR-CON) 20 MEQ tablet Take 1 tablet (20 mEq total) by mouth daily when taking lasix. 90 tablet 3       History reviewed. No pertinent past medical history.  Past Surgical History:   Procedure Laterality Date     COLONOSCOPY  2016     KNEE ARTHROPLASTY       OTHER SURGICAL HISTORY Right 09/28/2015    3cm lipoma excision from right wrist      [place] ARTHRODESIS ANT INTERBODY MIN DISCECTOMY,LUMBAR      Description: Lumbar Vertebral Fusion;  Proc Date: 04/23/1995;  Comments: L1-L2     [place] REMOVAL GALLBLADDER      Description: Cholecystectomy;  Recorded: 10/24/2012;     [place] SYMPATHECTOMY,LUMBAR      Description: Surgical Sympathectomy Lumbar;  Recorded: 10/24/2012;     TOTAL HIP ARTHROPLASTY Bilateral      UMBILICAL HERNIA REPAIR      Herniorrhaphy     WRIST FUSION Left 2003     Quetiapine; Diazepam; Titanium; and Allopurinol  Family History   Problem Relation Age of Onset     Hypertension Mother      Mental illness Mother      Heart disease Father      Congestive Heart Failure     Alcoholism Father      Mental illness Sister      Other Sister      Heroin adict     Mental illness Brother      Addiction     Kidney disease Brother      No Medical Problems Son      No Medical Problems Son      Diabetes Other      Cancer Other      Malignant Melanoma Of The Back      Social History     Social History     Marital status:      Spouse name: N/A     Number of children: N/A     Years of education: N/A     Occupational History     Not on file.     Social History Main Topics     Smoking status: Never Smoker     Smokeless tobacco: Never Used     Alcohol use Yes      Comment: Rarely     Drug use: No      Comment: Previous Marijuana use     Sexual activity: Yes     Partners: Female     Other Topics Concern     Not on file     Social History Narrative       Review of Systems  Review of Systems   Constitutional: Negative for activity change, appetite change, fever and unexpected  "weight change.   HENT: Negative for congestion, hearing loss and sore throat.    Eyes: Negative for discharge and visual disturbance.   Respiratory: Negative for cough, shortness of breath and wheezing.    Cardiovascular: Negative for chest pain, palpitations and leg swelling.   Gastrointestinal: Negative for abdominal pain, blood in stool, constipation, diarrhea and vomiting.   Endocrine: Negative for polydipsia and polyuria.   Genitourinary: Negative for decreased urine volume, difficulty urinating, dysuria, frequency, hematuria and urgency.   Allergic/Immunologic: Negative for immunocompromised state.   Neurological: Negative for weakness.   Hematological: Does not bruise/bleed easily.   Psychiatric/Behavioral: Negative for dysphoric mood and sleep disturbance. The patient is not nervous/anxious.              Objective:     Vitals:    04/06/18 1131   BP: 120/80   Pulse: (!) 108   Resp: 18   Temp: 98.9  F (37.2  C)   TempSrc: Oral   SpO2: 95%   Weight: (!) 277 lb (125.6 kg)   Height: 5' 8.5\" (1.74 m)     Body mass index is 41.51 kg/(m^2).    Physical  Physical Exam   Constitutional: He is oriented to person, place, and time. He appears well-developed and well-nourished. No distress.   HENT:   Right Ear: Tympanic membrane and ear canal normal.   Left Ear: Tympanic membrane and ear canal normal.   Mouth/Throat: Oropharynx is clear and moist and mucous membranes are normal.   Eyes: Conjunctivae and EOM are normal. Pupils are equal, round, and reactive to light.   Neck: Normal range of motion. Neck supple. Carotid bruit is not present.   Cardiovascular: Normal rate and regular rhythm.    No murmur heard.  Pulmonary/Chest: Effort normal and breath sounds normal. No respiratory distress. He has no wheezes. He has no rales.   Abdominal: Soft. Normal appearance and bowel sounds are normal. He exhibits no distension and no mass. There is no hepatosplenomegaly. There is no tenderness. Hernia confirmed negative in the " ventral area.   Musculoskeletal: Normal range of motion. He exhibits no edema.        Right foot: There is no tenderness, no bony tenderness and no swelling.        Left foot: There is no tenderness, no bony tenderness and no swelling.   Lymphadenopathy:     He has no cervical adenopathy.   Neurological: He is alert and oriented to person, place, and time. No cranial nerve deficit.   Skin: No rash noted.   Multiple benign appearing skin tags.   Psychiatric: He has a normal mood and affect. Thought content normal.

## 2021-06-20 NOTE — PROGRESS NOTES
Assessment/Plan:      Problem List Items Addressed This Visit     Essential hypertension     Blood pressure have been well controlled at home. Pressure was borderline when he first arrived today and high after discussion of normal labs. I suspect part of this is related to the stress of our discussion. Continue current treatment at this time. The patient is working hard on weight loss, exercise, and diet changes. Recheck in 3 months.         Hyperlipidemia - Primary     ASCVD 10 year risk is 9%. This was reviewed with patient and statin was recommended. Patient is refusing medications at this time. He is going to continue with lifestyle and return for fasting labs/office visit in 3 months.         Low testosterone in male     The patient has been referred to endocrinology for evaluation. We discussed that some of the lifestyle changes he is already working on may also improve his testosterone levels.         Morbid obesity (H)     Patient working aggressively on lifestyle changes. Recheck in 3 months.         Prediabetes     The patient is working on lifestyle changes. Metformin discussed but the patient is not interested at this time. Recheck in 3 months.         Relevant Orders    Lipid [place] FASTING    Comprehensive Metabolic Panel    Glycosylated Hemoglobin A1c      Other Visit Diagnoses     Elevated liver enzymes        Relevant Orders    US Abdomen Limited (Completed)         Patient Instructions   You will get a call to schedule liver ultrasound.    Continue with current efforts at increased exercise, weight loss, and Mediterranean-style diet.  Please return for fasting labs in 3 months and then an office visit with me a couple of days later.      Subjective:   Barrera Flores is a 48 y.o. male who presents today for follow up on recent labs.     Patient Active Problem List   Diagnosis     Obesity     Chronic Gout     Hyperlipidemia     Reflex Sympathetic Dystrophy     Osteoarthritis of both hips      Essential hypertension     Sleep apnea, obstructive     Kidney stone     PTSD (post-traumatic stress disorder)     Low testosterone in male     Hepatic steatosis     Prediabetes     Morbid obesity (H)      No past medical history on file.     Past Surgical History:   Procedure Laterality Date     COLONOSCOPY  2016     KNEE ARTHROPLASTY       OTHER SURGICAL HISTORY Right 09/28/2015    3cm lipoma excision from right wrist      [place] ARTHRODESIS ANT INTERBODY MIN DISCECTOMY,LUMBAR      Description: Lumbar Vertebral Fusion;  Proc Date: 04/23/1995;  Comments: L1-L2     [place] REMOVAL GALLBLADDER      Description: Cholecystectomy;  Recorded: 10/24/2012;     [place] SYMPATHECTOMY,LUMBAR      Description: Surgical Sympathectomy Lumbar;  Recorded: 10/24/2012;     TOTAL HIP ARTHROPLASTY Bilateral      UMBILICAL HERNIA REPAIR      Herniorrhaphy     WRIST FUSION Left 2003        Current Outpatient Prescriptions:      aspirin 325 MG tablet, Take 325 mg by mouth daily., Disp: , Rfl:      buPROPion (WELLBUTRIN XL) 150 MG 24 hr tablet, Take 1 tablet (150 mg total) by mouth every morning., Disp: 90 tablet, Rfl: 3     cholecalciferol, vitamin D3, 5,000 unit Tab, Take 5,000 Units by mouth., Disp: , Rfl:      furosemide (LASIX) 40 MG tablet, Take 1 tablet (40 mg total) by mouth daily., Disp: 90 tablet, Rfl: 3     losartan (COZAAR) 100 MG tablet, Take 1 tablet (100 mg total) by mouth daily. TAKE 1 TABLET BY MOUTH DAILY, Disp: 90 tablet, Rfl: 3     omeprazole (PRILOSEC) 20 MG capsule, Take 1 capsule (20 mg total) by mouth daily. one pill once daily, Disp: 90 capsule, Rfl: 1     potassium chloride SA (K-DUR,KLOR-CON) 20 MEQ tablet, Take 1 tablet (20 mEq total) by mouth daily., Disp: 90 tablet, Rfl: 3     triamcinolone (KENALOG) 0.025 % cream, Apply topically 3 (three) times a day as needed., Disp: 30 g, Rfl: 0      Review of Systems  Pertinent items are noted in HPI.  ROS otherwise negative.    Objective:     Vitals:    09/14/18 0921 09/14/18 0953  "  BP: 136/90 (!) 172/102   Pulse: 100    Resp: 18    Temp: 98.5  F (36.9  C)    TempSrc: Oral    SpO2: 93%    Weight: (!) 278 lb (126.1 kg)    Height: 5' 8.5\" (1.74 m)        Physical Exam   Constitutional: He appears well-nourished. No distress.     Results for orders placed or performed in visit on 08/29/18   Comprehensive Metabolic Panel   Result Value Ref Range    Sodium 139 136 - 145 mmol/L    Potassium 4.2 3.5 - 5.0 mmol/L    Chloride 108 (H) 98 - 107 mmol/L    CO2 21 (L) 22 - 31 mmol/L    Anion Gap, Calculation 10 5 - 18 mmol/L    Glucose 119 70 - 125 mg/dL    BUN 14 8 - 22 mg/dL    Creatinine 0.79 0.70 - 1.30 mg/dL    GFR MDRD Af Amer >60 >60 mL/min/1.73m2    GFR MDRD Non Af Amer >60 >60 mL/min/1.73m2    Bilirubin, Total 1.8 (H) 0.0 - 1.0 mg/dL    Calcium 9.4 8.5 - 10.5 mg/dL    Protein, Total 7.1 6.0 - 8.0 g/dL    Albumin 3.7 3.5 - 5.0 g/dL    Alkaline Phosphatase 66 45 - 120 U/L    AST 44 (H) 0 - 40 U/L    ALT 51 (H) 0 - 45 U/L   Lipid Cascade   Result Value Ref Range    Cholesterol 227 (H) <=199 mg/dL    Triglycerides 226 (H) <=149 mg/dL    HDL Cholesterol 40 >=40 mg/dL    LDL Calculated 142 (H) <=129 mg/dL    Patient Fasting > 8hrs? Yes    Glycosylated Hemoglobin A1C   Result Value Ref Range    Hemoglobin A1c 6.2 (H) 3.5 - 6.0 %   Testosterone, Total   Result Value Ref Range    Testosterone 143 (L) 240 - 871 ng/dL      "

## 2021-06-22 NOTE — PROGRESS NOTES
Assessment/Plan:      Problem List Items Addressed This Visit     Essential hypertension    Low testosterone in male     The patient has had significant improvement with weight loss and exercise.  Still slightly low but nearing normal.  He will continue with current life style changes.         Mixed hyperlipidemia - Primary     ASCVD 10-year risk: 4%.  This is down from 9% at his last visit with no medications.  He will continue with current lifestyle changes.  Recheck in 1 year.         Morbid obesity (H)    RESOLVED: Prediabetes     A1c has normalized with weight loss.  He will continue to work on lifestyle changes.  We should monitor at least yearly.               Patient Instructions   1. Continue with current medications.          Subjective:   Barrera Flores is a 48 y.o. male who presents today for recheck of a couple of ongoing issues including hypertension, low testosterone, hyperlipidemia, prediabetes, and depression.  The patient has been working very hard with exercise and diet and has lost 50 pounds.  He is exercising about 3 hours a day.  Has also made significant diet changes.  He is overall feeling much better.  Mood is still not perfect.  He is tolerating the Wellbutrin well and does not want to go to a higher dose.  He reports overall is taking the edge off his mood issues which is what he was hoping for.  He has not been checking his blood pressure outside the clinic.  He reports excellent compliance with his medications.  No other concerns today.    Patient Active Problem List   Diagnosis     Obesity     Chronic Gout     Mixed hyperlipidemia     Reflex Sympathetic Dystrophy     Osteoarthritis of both hips     Essential hypertension     Sleep apnea, obstructive     Kidney stone     PTSD (post-traumatic stress disorder)     Low testosterone in male     Hepatic steatosis     Morbid obesity (H)     Eczema      Past Medical History:   Diagnosis Date     Prediabetes 9/25/2018     Past Surgical  History:   Procedure Laterality Date     COLONOSCOPY  2016     KNEE ARTHROPLASTY       OTHER SURGICAL HISTORY Right 09/28/2015    3cm lipoma excision from right wrist      [place] ARTHRODESIS ANT INTERBODY MIN DISCECTOMY,LUMBAR      Description: Lumbar Vertebral Fusion;  Proc Date: 04/23/1995;  Comments: L1-L2     [place] REMOVAL GALLBLADDER      Description: Cholecystectomy;  Recorded: 10/24/2012;     [place] SYMPATHECTOMY,LUMBAR      Description: Surgical Sympathectomy Lumbar;  Recorded: 10/24/2012;     TOTAL HIP ARTHROPLASTY Bilateral      UMBILICAL HERNIA REPAIR      Herniorrhaphy     WRIST FUSION Left 2003           Review of Systems  Pertinent items are noted in HPI.  ROS otherwise negative.    Objective:     Vitals:    12/20/18 0911 12/20/18 0943   BP: (!) 116/96 112/80   Pulse: (!) 112    Weight: (!) 254 lb 14.4 oz (115.6 kg)        Physical Exam   Constitutional: He appears well-nourished. No distress.   HENT:   Right Ear: Tympanic membrane and ear canal normal.   Left Ear: Tympanic membrane and ear canal normal.   Mouth/Throat: Oropharynx is clear and moist.   Neck: Normal range of motion.   Cardiovascular: Normal rate and regular rhythm.   No murmur heard.  Pulmonary/Chest: Effort normal and breath sounds normal. No respiratory distress. He has no wheezes. He has no rales.   Abdominal: Soft. Bowel sounds are normal.   Musculoskeletal: He exhibits no edema.   Lymphadenopathy:     He has no cervical adenopathy.   Psychiatric: He has a normal mood and affect.        Results for orders placed or performed in visit on 12/13/18   Lipid [place] FASTING   Result Value Ref Range    Cholesterol 192 <=199 mg/dL    Triglycerides 273 (H) <=149 mg/dL    HDL Cholesterol 35 (L) >=40 mg/dL    LDL Calculated 102 <=129 mg/dL    Patient Fasting > 8hrs? Yes    Comprehensive Metabolic Panel   Result Value Ref Range    Sodium 142 136 - 145 mmol/L    Potassium 3.9 3.5 - 5.0 mmol/L    Chloride 107 98 - 107 mmol/L    CO2 26 22 - 31 mmol/L     Anion Gap, Calculation 9 5 - 18 mmol/L    Glucose 116 70 - 125 mg/dL    BUN 24 (H) 8 - 22 mg/dL    Creatinine 0.95 0.70 - 1.30 mg/dL    GFR MDRD Af Amer >60 >60 mL/min/1.73m2    GFR MDRD Non Af Amer >60 >60 mL/min/1.73m2    Bilirubin, Total 1.2 (H) 0.0 - 1.0 mg/dL    Calcium 9.4 8.5 - 10.5 mg/dL    Protein, Total 6.7 6.0 - 8.0 g/dL    Albumin 3.8 3.5 - 5.0 g/dL    Alkaline Phosphatase 67 45 - 120 U/L    AST 30 0 - 40 U/L    ALT 27 0 - 45 U/L   Glycosylated Hemoglobin A1c   Result Value Ref Range    Hemoglobin A1c 5.7 3.5 - 6.0 %   Testosterone, Total   Result Value Ref Range    Testosterone 216 (L) 240 - 871 ng/dL
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
## 2021-06-27 ENCOUNTER — HEALTH MAINTENANCE LETTER (OUTPATIENT)
Age: 51
End: 2021-06-27

## 2021-07-03 NOTE — ADDENDUM NOTE
Addendum Note by Selvin Mcrae CMA at 11/28/2017  1:37 PM     Author: Selvin Mcrae CMA Service: -- Author Type: Certified Medical Assistant    Filed: 11/28/2017  1:37 PM Encounter Date: 11/27/2017 Status: Signed    : Selvin Mcrae CMA (Certified Medical Assistant)    Addended by: SELVIN MCRAE on: 11/28/2017 01:37 PM        Modules accepted: Orders

## 2021-08-11 ENCOUNTER — LAB REQUISITION (OUTPATIENT)
Dept: LAB | Facility: CLINIC | Age: 51
End: 2021-08-11
Payer: COMMERCIAL

## 2021-08-11 DIAGNOSIS — Z12.5 ENCOUNTER FOR SCREENING FOR MALIGNANT NEOPLASM OF PROSTATE: ICD-10-CM

## 2021-08-11 DIAGNOSIS — I10 ESSENTIAL (PRIMARY) HYPERTENSION: ICD-10-CM

## 2021-08-11 DIAGNOSIS — E78.2 MIXED HYPERLIPIDEMIA: ICD-10-CM

## 2021-08-11 LAB
ALBUMIN SERPL-MCNC: 3.9 G/DL (ref 3.5–5)
ALP SERPL-CCNC: 74 U/L (ref 45–120)
ALT SERPL W P-5'-P-CCNC: 32 U/L (ref 0–45)
ANION GAP SERPL CALCULATED.3IONS-SCNC: 9 MMOL/L (ref 5–18)
AST SERPL W P-5'-P-CCNC: 27 U/L (ref 0–40)
BILIRUB SERPL-MCNC: 1.1 MG/DL (ref 0–1)
BUN SERPL-MCNC: 13 MG/DL (ref 8–22)
CALCIUM SERPL-MCNC: 9.7 MG/DL (ref 8.5–10.5)
CHLORIDE BLD-SCNC: 106 MMOL/L (ref 98–107)
CHOLEST SERPL-MCNC: 215 MG/DL
CO2 SERPL-SCNC: 25 MMOL/L (ref 22–31)
CREAT SERPL-MCNC: 0.8 MG/DL (ref 0.7–1.3)
ERYTHROCYTE [DISTWIDTH] IN BLOOD BY AUTOMATED COUNT: 13.3 % (ref 10–15)
FASTING STATUS PATIENT QL REPORTED: ABNORMAL
GFR SERPL CREATININE-BSD FRML MDRD: >90 ML/MIN/1.73M2
GLUCOSE BLD-MCNC: 125 MG/DL (ref 70–125)
HCT VFR BLD AUTO: 48.5 % (ref 40–53)
HDLC SERPL-MCNC: 46 MG/DL
HGB BLD-MCNC: 16.2 G/DL (ref 13.3–17.7)
LDLC SERPL CALC-MCNC: 106 MG/DL
MCH RBC QN AUTO: 27.8 PG (ref 26.5–33)
MCHC RBC AUTO-ENTMCNC: 33.4 G/DL (ref 31.5–36.5)
MCV RBC AUTO: 83 FL (ref 78–100)
PLATELET # BLD AUTO: 164 10E3/UL (ref 150–450)
POTASSIUM BLD-SCNC: 4 MMOL/L (ref 3.5–5)
PROT SERPL-MCNC: 7.1 G/DL (ref 6–8)
PSA SERPL-MCNC: 1.57 UG/L (ref 0–3.5)
RBC # BLD AUTO: 5.83 10E6/UL (ref 4.4–5.9)
SODIUM SERPL-SCNC: 140 MMOL/L (ref 136–145)
TRIGL SERPL-MCNC: 314 MG/DL
WBC # BLD AUTO: 8.1 10E3/UL (ref 4–11)

## 2021-08-11 PROCEDURE — 80053 COMPREHEN METABOLIC PANEL: CPT | Mod: ORL | Performed by: FAMILY MEDICINE

## 2021-08-11 PROCEDURE — 80061 LIPID PANEL: CPT | Mod: ORL | Performed by: FAMILY MEDICINE

## 2021-08-11 PROCEDURE — 85027 COMPLETE CBC AUTOMATED: CPT | Mod: ORL | Performed by: FAMILY MEDICINE

## 2021-08-11 PROCEDURE — G0103 PSA SCREENING: HCPCS | Mod: ORL | Performed by: FAMILY MEDICINE

## 2021-10-04 ENCOUNTER — LAB REQUISITION (OUTPATIENT)
Dept: LAB | Facility: CLINIC | Age: 51
End: 2021-10-04
Payer: COMMERCIAL

## 2021-10-04 DIAGNOSIS — I10 ESSENTIAL (PRIMARY) HYPERTENSION: ICD-10-CM

## 2021-10-04 LAB
ANION GAP SERPL CALCULATED.3IONS-SCNC: 12 MMOL/L (ref 5–18)
BUN SERPL-MCNC: 18 MG/DL (ref 8–22)
CALCIUM SERPL-MCNC: 9.6 MG/DL (ref 8.5–10.5)
CHLORIDE BLD-SCNC: 112 MMOL/L (ref 98–107)
CO2 SERPL-SCNC: 17 MMOL/L (ref 22–31)
CREAT SERPL-MCNC: 0.84 MG/DL (ref 0.7–1.3)
ERYTHROCYTE [DISTWIDTH] IN BLOOD BY AUTOMATED COUNT: 14 % (ref 10–15)
GFR SERPL CREATININE-BSD FRML MDRD: >90 ML/MIN/1.73M2
GLUCOSE BLD-MCNC: 137 MG/DL (ref 70–125)
HCT VFR BLD AUTO: 45.5 % (ref 40–53)
HGB BLD-MCNC: 15.9 G/DL (ref 13.3–17.7)
MCH RBC QN AUTO: 28.3 PG (ref 26.5–33)
MCHC RBC AUTO-ENTMCNC: 34.9 G/DL (ref 31.5–36.5)
MCV RBC AUTO: 81 FL (ref 78–100)
PLATELET # BLD AUTO: 184 10E3/UL (ref 150–450)
POTASSIUM BLD-SCNC: 3.9 MMOL/L (ref 3.5–5)
RBC # BLD AUTO: 5.61 10E6/UL (ref 4.4–5.9)
SODIUM SERPL-SCNC: 141 MMOL/L (ref 136–145)
WBC # BLD AUTO: 6.9 10E3/UL (ref 4–11)

## 2021-10-04 PROCEDURE — 85027 COMPLETE CBC AUTOMATED: CPT | Mod: ORL | Performed by: FAMILY MEDICINE

## 2021-10-04 PROCEDURE — 80048 BASIC METABOLIC PNL TOTAL CA: CPT | Mod: ORL | Performed by: FAMILY MEDICINE

## 2021-10-17 ENCOUNTER — HEALTH MAINTENANCE LETTER (OUTPATIENT)
Age: 51
End: 2021-10-17

## 2021-11-10 ENCOUNTER — HOSPITAL ENCOUNTER (OUTPATIENT)
Dept: ULTRASOUND IMAGING | Facility: HOSPITAL | Age: 51
Discharge: HOME | End: 2021-11-10
Attending: ORTHOPAEDIC SURGERY | Admitting: ORTHOPAEDIC SURGERY
Payer: COMMERCIAL

## 2021-11-10 DIAGNOSIS — M79.669 CALF PAIN: ICD-10-CM

## 2021-11-10 PROCEDURE — 93971 EXTREMITY STUDY: CPT | Mod: RT

## 2022-07-23 ENCOUNTER — HEALTH MAINTENANCE LETTER (OUTPATIENT)
Age: 52
End: 2022-07-23

## 2022-09-22 ENCOUNTER — LAB REQUISITION (OUTPATIENT)
Dept: LAB | Facility: CLINIC | Age: 52
End: 2022-09-22
Payer: COMMERCIAL

## 2022-09-22 DIAGNOSIS — I10 ESSENTIAL (PRIMARY) HYPERTENSION: ICD-10-CM

## 2022-09-22 DIAGNOSIS — E78.2 MIXED HYPERLIPIDEMIA: ICD-10-CM

## 2022-09-22 DIAGNOSIS — M1A.0720 IDIOPATHIC CHRONIC GOUT, LEFT ANKLE AND FOOT, WITHOUT TOPHUS (TOPHI): ICD-10-CM

## 2022-09-22 DIAGNOSIS — Z12.5 ENCOUNTER FOR SCREENING FOR MALIGNANT NEOPLASM OF PROSTATE: ICD-10-CM

## 2022-09-22 LAB
ALBUMIN SERPL BCG-MCNC: 4.2 G/DL (ref 3.5–5.2)
ALP SERPL-CCNC: 79 U/L (ref 40–129)
ALT SERPL W P-5'-P-CCNC: 50 U/L (ref 10–50)
ANION GAP SERPL CALCULATED.3IONS-SCNC: 13 MMOL/L (ref 7–15)
AST SERPL W P-5'-P-CCNC: 51 U/L (ref 10–50)
BILIRUB SERPL-MCNC: 0.7 MG/DL
BUN SERPL-MCNC: 14.1 MG/DL (ref 6–20)
CALCIUM SERPL-MCNC: 9.7 MG/DL (ref 8.6–10)
CHLORIDE SERPL-SCNC: 103 MMOL/L (ref 98–107)
CHOLEST SERPL-MCNC: 231 MG/DL
CREAT SERPL-MCNC: 0.85 MG/DL (ref 0.67–1.17)
DEPRECATED HCO3 PLAS-SCNC: 21 MMOL/L (ref 22–29)
ERYTHROCYTE [DISTWIDTH] IN BLOOD BY AUTOMATED COUNT: 14.6 % (ref 10–15)
GFR SERPL CREATININE-BSD FRML MDRD: >90 ML/MIN/1.73M2
GLUCOSE SERPL-MCNC: 176 MG/DL (ref 70–99)
HCT VFR BLD AUTO: 48.7 % (ref 40–53)
HDLC SERPL-MCNC: 41 MG/DL
HGB BLD-MCNC: 16.6 G/DL (ref 13.3–17.7)
LDLC SERPL CALC-MCNC: ABNORMAL MG/DL
MCH RBC QN AUTO: 28.4 PG (ref 26.5–33)
MCHC RBC AUTO-ENTMCNC: 34.1 G/DL (ref 31.5–36.5)
MCV RBC AUTO: 83 FL (ref 78–100)
NONHDLC SERPL-MCNC: 190 MG/DL
PLATELET # BLD AUTO: 158 10E3/UL (ref 150–450)
POTASSIUM SERPL-SCNC: 3.8 MMOL/L (ref 3.4–5.3)
PROT SERPL-MCNC: 6.9 G/DL (ref 6.4–8.3)
PSA SERPL-MCNC: 1.32 NG/ML (ref 0–3.5)
RBC # BLD AUTO: 5.85 10E6/UL (ref 4.4–5.9)
SODIUM SERPL-SCNC: 137 MMOL/L (ref 136–145)
TRIGL SERPL-MCNC: 591 MG/DL
URATE SERPL-MCNC: 5.3 MG/DL (ref 3.4–7)
WBC # BLD AUTO: 8.8 10E3/UL (ref 4–11)

## 2022-09-22 PROCEDURE — 80061 LIPID PANEL: CPT | Mod: ORL | Performed by: FAMILY MEDICINE

## 2022-09-22 PROCEDURE — 80053 COMPREHEN METABOLIC PANEL: CPT | Mod: ORL | Performed by: FAMILY MEDICINE

## 2022-09-22 PROCEDURE — 85027 COMPLETE CBC AUTOMATED: CPT | Mod: ORL | Performed by: FAMILY MEDICINE

## 2022-09-22 PROCEDURE — 84550 ASSAY OF BLOOD/URIC ACID: CPT | Mod: ORL | Performed by: FAMILY MEDICINE

## 2022-09-22 PROCEDURE — 83721 ASSAY OF BLOOD LIPOPROTEIN: CPT | Mod: ORL | Performed by: FAMILY MEDICINE

## 2022-09-22 PROCEDURE — G0103 PSA SCREENING: HCPCS | Mod: ORL | Performed by: FAMILY MEDICINE

## 2022-09-23 LAB — LDLC SERPL DIRECT ASSAY-MCNC: 120 MG/DL

## 2022-11-29 ENCOUNTER — LAB REQUISITION (OUTPATIENT)
Dept: LAB | Facility: CLINIC | Age: 52
End: 2022-11-29
Payer: COMMERCIAL

## 2022-11-29 DIAGNOSIS — R73.09 OTHER ABNORMAL GLUCOSE: ICD-10-CM

## 2022-11-29 LAB — HBA1C MFR BLD: 6.2 %

## 2022-11-29 PROCEDURE — 83036 HEMOGLOBIN GLYCOSYLATED A1C: CPT | Mod: ORL | Performed by: FAMILY MEDICINE

## 2023-08-12 ENCOUNTER — HEALTH MAINTENANCE LETTER (OUTPATIENT)
Age: 53
End: 2023-08-12

## 2023-09-26 ENCOUNTER — LAB REQUISITION (OUTPATIENT)
Dept: LAB | Facility: CLINIC | Age: 53
End: 2023-09-26
Payer: COMMERCIAL

## 2023-09-26 DIAGNOSIS — R73.03 PREDIABETES: ICD-10-CM

## 2023-09-26 DIAGNOSIS — E78.2 MIXED HYPERLIPIDEMIA: ICD-10-CM

## 2023-09-26 DIAGNOSIS — I10 ESSENTIAL (PRIMARY) HYPERTENSION: ICD-10-CM

## 2023-09-26 DIAGNOSIS — Z12.5 ENCOUNTER FOR SCREENING FOR MALIGNANT NEOPLASM OF PROSTATE: ICD-10-CM

## 2023-09-26 LAB
ALBUMIN SERPL BCG-MCNC: 4.3 G/DL (ref 3.5–5.2)
ALP SERPL-CCNC: 62 U/L (ref 40–129)
ALT SERPL W P-5'-P-CCNC: 22 U/L (ref 0–70)
ANION GAP SERPL CALCULATED.3IONS-SCNC: 13 MMOL/L (ref 7–15)
AST SERPL W P-5'-P-CCNC: 23 U/L (ref 0–45)
BILIRUB SERPL-MCNC: 1.1 MG/DL
BUN SERPL-MCNC: 10.6 MG/DL (ref 6–20)
CALCIUM SERPL-MCNC: 9.4 MG/DL (ref 8.6–10)
CHLORIDE SERPL-SCNC: 105 MMOL/L (ref 98–107)
CHOLEST SERPL-MCNC: 229 MG/DL
CREAT SERPL-MCNC: 0.75 MG/DL (ref 0.67–1.17)
DEPRECATED HCO3 PLAS-SCNC: 22 MMOL/L (ref 22–29)
EGFRCR SERPLBLD CKD-EPI 2021: >90 ML/MIN/1.73M2
ERYTHROCYTE [DISTWIDTH] IN BLOOD BY AUTOMATED COUNT: 13.8 % (ref 10–15)
GLUCOSE SERPL-MCNC: 118 MG/DL (ref 70–99)
HBA1C MFR BLD: 6.3 %
HCT VFR BLD AUTO: 48.8 % (ref 40–53)
HDLC SERPL-MCNC: 56 MG/DL
HGB BLD-MCNC: 16.5 G/DL (ref 13.3–17.7)
LDLC SERPL CALC-MCNC: 135 MG/DL
MCH RBC QN AUTO: 29 PG (ref 26.5–33)
MCHC RBC AUTO-ENTMCNC: 33.8 G/DL (ref 31.5–36.5)
MCV RBC AUTO: 86 FL (ref 78–100)
NONHDLC SERPL-MCNC: 173 MG/DL
PLATELET # BLD AUTO: 153 10E3/UL (ref 150–450)
POTASSIUM SERPL-SCNC: 4.1 MMOL/L (ref 3.4–5.3)
PROT SERPL-MCNC: 7 G/DL (ref 6.4–8.3)
PSA SERPL DL<=0.01 NG/ML-MCNC: 1.45 NG/ML (ref 0–3.5)
RBC # BLD AUTO: 5.69 10E6/UL (ref 4.4–5.9)
SODIUM SERPL-SCNC: 140 MMOL/L (ref 135–145)
TRIGL SERPL-MCNC: 190 MG/DL
WBC # BLD AUTO: 9 10E3/UL (ref 4–11)

## 2023-09-26 PROCEDURE — 85027 COMPLETE CBC AUTOMATED: CPT | Mod: ORL | Performed by: FAMILY MEDICINE

## 2023-09-26 PROCEDURE — 80053 COMPREHEN METABOLIC PANEL: CPT | Mod: ORL | Performed by: FAMILY MEDICINE

## 2023-09-26 PROCEDURE — G0103 PSA SCREENING: HCPCS | Mod: ORL | Performed by: FAMILY MEDICINE

## 2023-09-26 PROCEDURE — 80061 LIPID PANEL: CPT | Mod: ORL | Performed by: FAMILY MEDICINE

## 2023-09-26 PROCEDURE — 83036 HEMOGLOBIN GLYCOSYLATED A1C: CPT | Mod: ORL | Performed by: FAMILY MEDICINE

## 2024-05-08 NOTE — PATIENT INSTRUCTIONS - HE
It is my pleasure seeing you in the clinic today.    Please do lab test today.  Continue with dieting and exercise this will bring the testosterone up naturally.  I do not advise testosterone replacement because the risk outweighs the benefit.  Please continue to follow-up with primary care physician.              Thank you for allowing me to participate in your care.        We work very hard to achieve the best quality of care.  We would be very grateful if you complete any survey you receive regarding this visit, so that we continue to improve our care.     SCM

## 2024-09-29 ENCOUNTER — HEALTH MAINTENANCE LETTER (OUTPATIENT)
Age: 54
End: 2024-09-29

## 2024-10-03 ENCOUNTER — LAB REQUISITION (OUTPATIENT)
Dept: LAB | Facility: CLINIC | Age: 54
End: 2024-10-03
Payer: COMMERCIAL

## 2024-10-03 DIAGNOSIS — M1A.0720 IDIOPATHIC CHRONIC GOUT, LEFT ANKLE AND FOOT, WITHOUT TOPHUS (TOPHI): ICD-10-CM

## 2024-10-03 DIAGNOSIS — E78.2 MIXED HYPERLIPIDEMIA: ICD-10-CM

## 2024-10-03 LAB
ERYTHROCYTE [DISTWIDTH] IN BLOOD BY AUTOMATED COUNT: 14.8 % (ref 10–15)
HCT VFR BLD AUTO: 47.4 % (ref 40–53)
HGB BLD-MCNC: 16 G/DL (ref 13.3–17.7)
MCH RBC QN AUTO: 28.8 PG (ref 26.5–33)
MCHC RBC AUTO-ENTMCNC: 33.8 G/DL (ref 31.5–36.5)
MCV RBC AUTO: 85 FL (ref 78–100)
PLATELET # BLD AUTO: 161 10E3/UL (ref 150–450)
RBC # BLD AUTO: 5.56 10E6/UL (ref 4.4–5.9)
WBC # BLD AUTO: 7.3 10E3/UL (ref 4–11)

## 2024-10-03 PROCEDURE — 80061 LIPID PANEL: CPT | Mod: ORL | Performed by: FAMILY MEDICINE

## 2024-10-03 PROCEDURE — 80053 COMPREHEN METABOLIC PANEL: CPT | Mod: ORL | Performed by: FAMILY MEDICINE

## 2024-10-03 PROCEDURE — 85027 COMPLETE CBC AUTOMATED: CPT | Mod: ORL | Performed by: FAMILY MEDICINE

## 2024-10-03 PROCEDURE — 84550 ASSAY OF BLOOD/URIC ACID: CPT | Mod: ORL | Performed by: FAMILY MEDICINE

## 2024-10-04 LAB
ALBUMIN SERPL BCG-MCNC: 4.2 G/DL (ref 3.5–5.2)
ALP SERPL-CCNC: 69 U/L (ref 40–150)
ALT SERPL W P-5'-P-CCNC: 18 U/L (ref 0–70)
ANION GAP SERPL CALCULATED.3IONS-SCNC: 13 MMOL/L (ref 7–15)
AST SERPL W P-5'-P-CCNC: 27 U/L (ref 0–45)
BILIRUB SERPL-MCNC: 1.6 MG/DL
BUN SERPL-MCNC: 12.5 MG/DL (ref 6–20)
CALCIUM SERPL-MCNC: 9.2 MG/DL (ref 8.8–10.4)
CHLORIDE SERPL-SCNC: 108 MMOL/L (ref 98–107)
CHOLEST SERPL-MCNC: 186 MG/DL
CREAT SERPL-MCNC: 0.83 MG/DL (ref 0.67–1.17)
EGFRCR SERPLBLD CKD-EPI 2021: >90 ML/MIN/1.73M2
FASTING STATUS PATIENT QL REPORTED: ABNORMAL
FASTING STATUS PATIENT QL REPORTED: ABNORMAL
GLUCOSE SERPL-MCNC: 118 MG/DL (ref 70–99)
HCO3 SERPL-SCNC: 20 MMOL/L (ref 22–29)
HDLC SERPL-MCNC: 41 MG/DL
LDLC SERPL CALC-MCNC: 111 MG/DL
NONHDLC SERPL-MCNC: 145 MG/DL
POTASSIUM SERPL-SCNC: 3.9 MMOL/L (ref 3.4–5.3)
PROT SERPL-MCNC: 7.1 G/DL (ref 6.4–8.3)
SODIUM SERPL-SCNC: 141 MMOL/L (ref 135–145)
TRIGL SERPL-MCNC: 171 MG/DL
URATE SERPL-MCNC: 4.5 MG/DL (ref 3.4–7)

## 2024-12-20 ENCOUNTER — HOSPITAL ENCOUNTER (EMERGENCY)
Facility: CLINIC | Age: 54
Discharge: HOME | End: 2024-12-20
Attending: EMERGENCY MEDICINE | Admitting: EMERGENCY MEDICINE
Payer: COMMERCIAL

## 2024-12-20 ENCOUNTER — APPOINTMENT (OUTPATIENT)
Dept: CT IMAGING | Facility: CLINIC | Age: 54
End: 2024-12-20
Attending: EMERGENCY MEDICINE
Payer: COMMERCIAL

## 2024-12-20 ENCOUNTER — APPOINTMENT (OUTPATIENT)
Dept: MRI IMAGING | Facility: CLINIC | Age: 54
End: 2024-12-20
Attending: EMERGENCY MEDICINE
Payer: COMMERCIAL

## 2024-12-20 VITALS
RESPIRATION RATE: 16 BRPM | HEIGHT: 69 IN | BODY MASS INDEX: 35.55 KG/M2 | TEMPERATURE: 97.7 F | SYSTOLIC BLOOD PRESSURE: 173 MMHG | DIASTOLIC BLOOD PRESSURE: 106 MMHG | WEIGHT: 240 LBS | HEART RATE: 78 BPM | OXYGEN SATURATION: 99 %

## 2024-12-20 DIAGNOSIS — S06.0XAA CONCUSSION WITH UNKNOWN LOSS OF CONSCIOUSNESS STATUS, INITIAL ENCOUNTER: ICD-10-CM

## 2024-12-20 DIAGNOSIS — R51.9 ACUTE NONINTRACTABLE HEADACHE, UNSPECIFIED HEADACHE TYPE: ICD-10-CM

## 2024-12-20 LAB
ANION GAP SERPL CALCULATED.3IONS-SCNC: 12 MMOL/L (ref 7–15)
ATRIAL RATE - MUSE: 77 BPM
BASOPHILS # BLD AUTO: 0.1 10E3/UL (ref 0–0.2)
BASOPHILS NFR BLD AUTO: 1 %
BUN SERPL-MCNC: 10 MG/DL (ref 6–20)
CALCIUM SERPL-MCNC: 9.8 MG/DL (ref 8.8–10.4)
CHLORIDE SERPL-SCNC: 107 MMOL/L (ref 98–107)
CREAT SERPL-MCNC: 1.05 MG/DL (ref 0.67–1.17)
DIASTOLIC BLOOD PRESSURE - MUSE: NORMAL MMHG
EGFRCR SERPLBLD CKD-EPI 2021: 84 ML/MIN/1.73M2
EOSINOPHIL # BLD AUTO: 0.2 10E3/UL (ref 0–0.7)
EOSINOPHIL NFR BLD AUTO: 2 %
ERYTHROCYTE [DISTWIDTH] IN BLOOD BY AUTOMATED COUNT: 13.7 % (ref 10–15)
FLUAV RNA SPEC QL NAA+PROBE: NEGATIVE
FLUBV RNA RESP QL NAA+PROBE: NEGATIVE
GLUCOSE SERPL-MCNC: 113 MG/DL (ref 70–99)
HCO3 SERPL-SCNC: 20 MMOL/L (ref 22–29)
HCT VFR BLD AUTO: 47.5 % (ref 40–53)
HGB BLD-MCNC: 17.1 G/DL (ref 13.3–17.7)
IMM GRANULOCYTES # BLD: 0 10E3/UL
IMM GRANULOCYTES NFR BLD: 0 %
INR PPP: 1.15 (ref 0.85–1.15)
INTERPRETATION ECG - MUSE: NORMAL
LYMPHOCYTES # BLD AUTO: 1.7 10E3/UL (ref 0.8–5.3)
LYMPHOCYTES NFR BLD AUTO: 21 %
MCH RBC QN AUTO: 28.8 PG (ref 26.5–33)
MCHC RBC AUTO-ENTMCNC: 36 G/DL (ref 31.5–36.5)
MCV RBC AUTO: 80 FL (ref 78–100)
MONOCYTES # BLD AUTO: 0.7 10E3/UL (ref 0–1.3)
MONOCYTES NFR BLD AUTO: 9 %
NEUTROPHILS # BLD AUTO: 5.4 10E3/UL (ref 1.6–8.3)
NEUTROPHILS NFR BLD AUTO: 67 %
NRBC # BLD AUTO: 0 10E3/UL
NRBC BLD AUTO-RTO: 0 /100
P AXIS - MUSE: 40 DEGREES
PLATELET # BLD AUTO: 172 10E3/UL (ref 150–450)
POTASSIUM SERPL-SCNC: 3.5 MMOL/L (ref 3.4–5.3)
PR INTERVAL - MUSE: 192 MS
QRS DURATION - MUSE: 92 MS
QT - MUSE: 436 MS
QTC - MUSE: 493 MS
R AXIS - MUSE: 23 DEGREES
RBC # BLD AUTO: 5.94 10E6/UL (ref 4.4–5.9)
RSV RNA SPEC NAA+PROBE: NEGATIVE
SARS-COV-2 RNA RESP QL NAA+PROBE: NEGATIVE
SODIUM SERPL-SCNC: 139 MMOL/L (ref 135–145)
SYSTOLIC BLOOD PRESSURE - MUSE: NORMAL MMHG
T AXIS - MUSE: 39 DEGREES
TROPONIN T SERPL HS-MCNC: 11 NG/L
TROPONIN T SERPL HS-MCNC: 12 NG/L
VENTRICULAR RATE- MUSE: 77 BPM
WBC # BLD AUTO: 8 10E3/UL (ref 4–11)

## 2024-12-20 PROCEDURE — 85018 HEMOGLOBIN: CPT | Performed by: EMERGENCY MEDICINE

## 2024-12-20 PROCEDURE — 85610 PROTHROMBIN TIME: CPT | Performed by: EMERGENCY MEDICINE

## 2024-12-20 PROCEDURE — 87637 SARSCOV2&INF A&B&RSV AMP PRB: CPT | Performed by: EMERGENCY MEDICINE

## 2024-12-20 PROCEDURE — 96365 THER/PROPH/DIAG IV INF INIT: CPT | Mod: 59

## 2024-12-20 PROCEDURE — 36415 COLL VENOUS BLD VENIPUNCTURE: CPT | Performed by: EMERGENCY MEDICINE

## 2024-12-20 PROCEDURE — 96375 TX/PRO/DX INJ NEW DRUG ADDON: CPT | Mod: 59

## 2024-12-20 PROCEDURE — A9585 GADOBUTROL INJECTION: HCPCS | Performed by: EMERGENCY MEDICINE

## 2024-12-20 PROCEDURE — 70496 CT ANGIOGRAPHY HEAD: CPT

## 2024-12-20 PROCEDURE — 250N000011 HC RX IP 250 OP 636: Performed by: EMERGENCY MEDICINE

## 2024-12-20 PROCEDURE — 70553 MRI BRAIN STEM W/O & W/DYE: CPT

## 2024-12-20 PROCEDURE — 85025 COMPLETE CBC W/AUTO DIFF WBC: CPT | Performed by: EMERGENCY MEDICINE

## 2024-12-20 PROCEDURE — 85041 AUTOMATED RBC COUNT: CPT | Performed by: EMERGENCY MEDICINE

## 2024-12-20 PROCEDURE — 255N000002 HC RX 255 OP 636: Performed by: EMERGENCY MEDICINE

## 2024-12-20 PROCEDURE — 80048 BASIC METABOLIC PNL TOTAL CA: CPT | Performed by: EMERGENCY MEDICINE

## 2024-12-20 PROCEDURE — 258N000003 HC RX IP 258 OP 636: Performed by: EMERGENCY MEDICINE

## 2024-12-20 PROCEDURE — 84484 ASSAY OF TROPONIN QUANT: CPT | Performed by: EMERGENCY MEDICINE

## 2024-12-20 PROCEDURE — 250N000009 HC RX 250: Performed by: EMERGENCY MEDICINE

## 2024-12-20 PROCEDURE — 99285 EMERGENCY DEPT VISIT HI MDM: CPT | Mod: 25

## 2024-12-20 PROCEDURE — 93005 ELECTROCARDIOGRAM TRACING: CPT | Performed by: EMERGENCY MEDICINE

## 2024-12-20 PROCEDURE — 96361 HYDRATE IV INFUSION ADD-ON: CPT | Mod: 59

## 2024-12-20 RX ORDER — IOPAMIDOL 755 MG/ML
75 INJECTION, SOLUTION INTRAVASCULAR ONCE
Status: COMPLETED | OUTPATIENT
Start: 2024-12-20 | End: 2024-12-20

## 2024-12-20 RX ORDER — KETOROLAC TROMETHAMINE 15 MG/ML
15 INJECTION, SOLUTION INTRAMUSCULAR; INTRAVENOUS ONCE
Status: COMPLETED | OUTPATIENT
Start: 2024-12-20 | End: 2024-12-20

## 2024-12-20 RX ORDER — DIPHENHYDRAMINE HYDROCHLORIDE 50 MG/ML
25 INJECTION INTRAMUSCULAR; INTRAVENOUS ONCE
Status: COMPLETED | OUTPATIENT
Start: 2024-12-20 | End: 2024-12-20

## 2024-12-20 RX ORDER — METOCLOPRAMIDE HYDROCHLORIDE 5 MG/ML
10 INJECTION INTRAMUSCULAR; INTRAVENOUS ONCE
Status: COMPLETED | OUTPATIENT
Start: 2024-12-20 | End: 2024-12-20

## 2024-12-20 RX ORDER — GADOBUTROL 604.72 MG/ML
10 INJECTION INTRAVENOUS ONCE
Status: COMPLETED | OUTPATIENT
Start: 2024-12-20 | End: 2024-12-20

## 2024-12-20 RX ORDER — PROCHLORPERAZINE 25 MG
25 SUPPOSITORY, RECTAL RECTAL EVERY 12 HOURS PRN
Qty: 10 SUPPOSITORY | Refills: 0 | Status: SHIPPED | OUTPATIENT
Start: 2024-12-20

## 2024-12-20 RX ADMIN — METOCLOPRAMIDE 10 MG: 5 INJECTION, SOLUTION INTRAMUSCULAR; INTRAVENOUS at 09:51

## 2024-12-20 RX ADMIN — SODIUM CHLORIDE 1000 ML: 9 INJECTION, SOLUTION INTRAVENOUS at 09:44

## 2024-12-20 RX ADMIN — VALPROATE SODIUM 500 MG: 100 INJECTION, SOLUTION INTRAVENOUS at 12:40

## 2024-12-20 RX ADMIN — GADOBUTROL 10 ML: 604.72 INJECTION INTRAVENOUS at 11:01

## 2024-12-20 RX ADMIN — KETOROLAC TROMETHAMINE 15 MG: 15 INJECTION, SOLUTION INTRAMUSCULAR; INTRAVENOUS at 09:50

## 2024-12-20 RX ADMIN — IOPAMIDOL 75 ML: 755 INJECTION, SOLUTION INTRAVENOUS at 10:55

## 2024-12-20 RX ADMIN — DIPHENHYDRAMINE HYDROCHLORIDE 25 MG: 50 INJECTION INTRAMUSCULAR; INTRAVENOUS at 09:50

## 2024-12-20 ASSESSMENT — ACTIVITIES OF DAILY LIVING (ADL)
ADLS_ACUITY_SCORE: 41

## 2024-12-20 ASSESSMENT — COLUMBIA-SUICIDE SEVERITY RATING SCALE - C-SSRS
2. HAVE YOU ACTUALLY HAD ANY THOUGHTS OF KILLING YOURSELF IN THE PAST MONTH?: NO
6. HAVE YOU EVER DONE ANYTHING, STARTED TO DO ANYTHING, OR PREPARED TO DO ANYTHING TO END YOUR LIFE?: NO
1. IN THE PAST MONTH, HAVE YOU WISHED YOU WERE DEAD OR WISHED YOU COULD GO TO SLEEP AND NOT WAKE UP?: NO
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
## 2024-12-20 NOTE — ED NOTES
Pt reports symptoms have gotten better post valproate infusion. MD at bedside with pt and pts wife.
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
## 2024-12-20 NOTE — DISCHARGE INSTRUCTIONS
Recommend bed rest for the next 48 hours.  Drink plenty of fluids.  Continue using Zofran as needed for vomiting  You can also use rectal Compazine for nausea.  Recommend caffeine for your headache.  You been referred to the concussion clinic.  Recheck primary care doctor if CT finding that show possible fibromuscular dysplasia.  Recommend ibuprofen and/or Tylenol and/or Excedrin

## 2025-01-09 ENCOUNTER — VIRTUAL VISIT (OUTPATIENT)
Dept: PHYSICAL MEDICINE AND REHAB | Facility: CLINIC | Age: 55
End: 2025-01-09
Attending: EMERGENCY MEDICINE
Payer: COMMERCIAL

## 2025-01-09 VITALS — WEIGHT: 240 LBS | HEIGHT: 69 IN | BODY MASS INDEX: 35.55 KG/M2

## 2025-01-09 DIAGNOSIS — S09.90XS HEAD TRAUMA, SEQUELA: Primary | ICD-10-CM

## 2025-01-09 DIAGNOSIS — R20.0 RIGHT FACIAL NUMBNESS: ICD-10-CM

## 2025-01-09 DIAGNOSIS — H53.9 VISUAL DISTURBANCE: ICD-10-CM

## 2025-01-09 DIAGNOSIS — S06.0XAA CONCUSSION WITH UNKNOWN LOSS OF CONSCIOUSNESS STATUS, INITIAL ENCOUNTER: ICD-10-CM

## 2025-01-09 RX ORDER — GABAPENTIN 100 MG/1
100 CAPSULE ORAL AT BEDTIME
Qty: 30 CAPSULE | Refills: 1 | Status: SHIPPED | OUTPATIENT
Start: 2025-01-09

## 2025-01-09 ASSESSMENT — PAIN SCALES - GENERAL: PAINLEVEL_OUTOF10: SEVERE PAIN (6)
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
## 2025-01-09 NOTE — LETTER
2025    Barrera Flores   1970        To Whom it May Concern;    Please excuse Barrera Flores from work for due a visit with the Concussion Clinic visit on 2025. Patient is under care of the concussion clinic and will need to remain off work until further evaluation. Due to on going neurological and vision symptoms patient will need clearance until he can drive. He will be establishing with rehabilitation, and has been referred to Neuro-opthalmology and Neurosurgery.    Sincerely,      Meseret Bustamante PA-C
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
## 2025-01-09 NOTE — NURSING NOTE
Current patient location: [address] [place] [id number]    Is the patient currently in the state of [place]? YES    Visit mode: VIDEO    If the visit is dropped, the patient can be reconnected by:VIDEO VISIT: Text to cell phone:   Telephone Information:   Mobile [phone]       Will anyone else be joining the visit? NO  (If patient encounters technical issues they should call [phone]    Are changes needed to the allergy or medication list? No    Are refills needed on medications prescribed by this physician? NO    Rooming Documentation:  Questionnaire(s) completed    Reason for visit: Consult     [place] ROD

## 2025-01-09 NOTE — PATIENT INSTRUCTIONS
"To Do:   Neurosurgery referral   Neurophthalmology  Physical therapy for dizziness  Occupational therapy for vision/headache/ sound/and light sensitivity  Speech therapy for language cognitive complaints  Gabapentin 100mg at night  Return in 4 weeks    Here is some review material that I hope you find helpful:     The brain requires more energy to function than any other organ in the body, so when the brain is injured, more energy (often double the usual amount) is needed to perform life s usual daily functions.      Your healing brain will tire more easily.      Your brain is less efficient during healing and cannot give the same amount of energy to the ordinary daily activities you want to do.      Symptoms of fatigue, forgetting, headaches, irritability, nausea, dizziness or vision changes can occur as a warning that the brain is in need of more rest for healing.         This concept of \"Energy Pie\" is meant to guide your recovery after a concussion to support your brain & body to slowly regain energy and stamina.           DIAGRAM OF HOW THE BRAIN USES ITS ENERGY for:   Cognitive (thinking) activity + Physical demands + Emotional control                                                           LEFT picture is BEFORE (normal)                  RIGHT picture is AFTER A CONCUSSION     It takes more brain energy to do everything after you have a concussion. Thinking, being active/ attentive, and handling your emotions use more energy from the reserve, then the left over \"reserve energy\" is much less which further slows down healing.     Symptoms such as physical fatigue, headache, brain fog, difficulty concentrating and emotional changes (mood swings) tend to increase because the backup reserve energy is being used to get you through the day.     Reserve energy is needed for healing, and the normal amount of \"reserve energy\" your brain uses to manage everyday surprises/ stress is temporarily about half of what it " "was before a concussion.     If you try to \"push through\" brain recovery without mindfully building in new periods for \"brain rest\", your concussion symptoms will tend to increase.      Keeping this picture in mind can be helpful to lessen your concussion symptoms on a daily basis.     BRAIN REST TIPS:  Keep your usual, regular sleep schedule which is what your body already needs at your baseline to recover from normal everyday activity and to give your metabolism, muscles and organs time to recuperate.  This baseline sleep is still needed in addition to your new \"brain rest\".     During your normal waking hours be aware of your \"active\" periods such as talking, reading, TV, emmanuel or physical activity as well as any emotionally demanding situations or meetings so you can allow time for brain rest.      Your time working at a job away from home or remote work from your home is part of these active times and must be balanced with a similar period of brain rest.     You create balance by giving yourself an equal amount of time in \"brain rest\" to offset the energy used for all other activities in the first weeks after your injury.  Over time, you will see that you can gradually increase time for the \"normal\" activities as your symptoms lessen and the amount of brain rest needed will decrease in the coming weeks as you heal.      Driving a car is a high stress activity because it uses the whole brain: thinking, physical actions, and emotional energy.      You define how your your down time looks - typically, a quiet, semi-darkened room without distraction, but you could use \"white noise\" or classical / calming  music if that works for you - you know yourself best!      Avoid all blue light and LED lighting as much as possible in the evening because that stimulates the brain and can make it more difficult to fall asleep.     Recovering from concussion does take the \"tincture of time\" PLUS, by keeping this model in mind, " you promote your brain's healing in a positive way.     Share this information with your family and they will support your efforts to cut back responsibilities temporarily while you are recovering.     No

## 2025-01-22 ENCOUNTER — THERAPY VISIT (OUTPATIENT)
Dept: SPEECH THERAPY | Facility: REHABILITATION | Age: 55
End: 2025-01-22
Attending: PHYSICIAN ASSISTANT
Payer: COMMERCIAL

## 2025-01-22 DIAGNOSIS — R41.841 COGNITIVE COMMUNICATION DEFICIT: Primary | ICD-10-CM

## 2025-01-22 DIAGNOSIS — S06.0XAA CONCUSSION WITH UNKNOWN LOSS OF CONSCIOUSNESS STATUS, INITIAL ENCOUNTER: ICD-10-CM

## 2025-01-22 DIAGNOSIS — R20.0 RIGHT FACIAL NUMBNESS: ICD-10-CM

## 2025-01-22 DIAGNOSIS — H53.9 VISUAL DISTURBANCE: ICD-10-CM

## 2025-01-22 DIAGNOSIS — S09.90XS HEAD TRAUMA, SEQUELA: ICD-10-CM

## 2025-01-22 PROCEDURE — 92523 SPEECH SOUND LANG COMPREHEN: CPT | Mod: GN | Performed by: SPEECH-LANGUAGE PATHOLOGIST
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
## 2025-01-29 ENCOUNTER — THERAPY VISIT (OUTPATIENT)
Dept: SPEECH THERAPY | Facility: REHABILITATION | Age: 55
End: 2025-01-29
Attending: PHYSICIAN ASSISTANT
Payer: COMMERCIAL

## 2025-01-29 DIAGNOSIS — R41.841 COGNITIVE COMMUNICATION DEFICIT: Primary | ICD-10-CM

## 2025-01-29 PROCEDURE — 92507 TX SP LANG VOICE COMM INDIV: CPT | Mod: GN | Performed by: SPEECH-LANGUAGE PATHOLOGIST

## 2025-01-30 ENCOUNTER — THERAPY VISIT (OUTPATIENT)
Dept: PHYSICAL THERAPY | Facility: REHABILITATION | Age: 55
End: 2025-01-30
Attending: PHYSICIAN ASSISTANT
Payer: COMMERCIAL

## 2025-01-30 DIAGNOSIS — S06.0XAA CONCUSSION WITH UNKNOWN LOSS OF CONSCIOUSNESS STATUS, INITIAL ENCOUNTER: ICD-10-CM

## 2025-01-30 DIAGNOSIS — S09.90XS HEAD TRAUMA, SEQUELA: ICD-10-CM

## 2025-01-30 DIAGNOSIS — R20.0 RIGHT FACIAL NUMBNESS: ICD-10-CM

## 2025-01-30 DIAGNOSIS — H53.9 VISUAL DISTURBANCE: ICD-10-CM

## 2025-01-30 DIAGNOSIS — M54.2 NECK PAIN: Primary | ICD-10-CM
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
## 2025-01-31 ENCOUNTER — THERAPY VISIT (OUTPATIENT)
Dept: PHYSICAL THERAPY | Facility: REHABILITATION | Age: 55
End: 2025-01-31
Payer: COMMERCIAL

## 2025-01-31 DIAGNOSIS — R20.0 RIGHT FACIAL NUMBNESS: ICD-10-CM

## 2025-01-31 DIAGNOSIS — M54.2 NECK PAIN: ICD-10-CM

## 2025-01-31 DIAGNOSIS — S06.0XAA CONCUSSION WITH UNKNOWN LOSS OF CONSCIOUSNESS STATUS, INITIAL ENCOUNTER: Primary | ICD-10-CM

## 2025-01-31 DIAGNOSIS — S09.90XS HEAD TRAUMA, SEQUELA: ICD-10-CM

## 2025-01-31 PROCEDURE — 97112 NEUROMUSCULAR REEDUCATION: CPT | Mod: GP

## 2025-02-04 ENCOUNTER — THERAPY VISIT (OUTPATIENT)
Dept: PHYSICAL THERAPY | Facility: REHABILITATION | Age: 55
End: 2025-02-04
Payer: COMMERCIAL

## 2025-02-04 DIAGNOSIS — M54.2 NECK PAIN: Primary | ICD-10-CM

## 2025-02-04 DIAGNOSIS — S06.0XAA CONCUSSION WITH UNKNOWN LOSS OF CONSCIOUSNESS STATUS, INITIAL ENCOUNTER: ICD-10-CM

## 2025-02-04 DIAGNOSIS — S09.90XS HEAD TRAUMA, SEQUELA: ICD-10-CM

## 2025-02-04 DIAGNOSIS — R20.0 RIGHT FACIAL NUMBNESS: ICD-10-CM

## 2025-02-04 PROCEDURE — 97140 MANUAL THERAPY 1/> REGIONS: CPT | Mod: GP | Performed by: PHYSICAL THERAPIST

## 2025-02-17 ENCOUNTER — THERAPY VISIT (OUTPATIENT)
Dept: OCCUPATIONAL THERAPY | Facility: REHABILITATION | Age: 55
End: 2025-02-17
Payer: COMMERCIAL

## 2025-02-17 DIAGNOSIS — H83.3X3 SOUND SENSITIVITY IN BOTH EARS: ICD-10-CM

## 2025-02-17 DIAGNOSIS — H53.9 VISION CHANGES: ICD-10-CM

## 2025-02-17 DIAGNOSIS — R41.3 MEMORY CHANGES: ICD-10-CM

## 2025-02-17 DIAGNOSIS — H53.143 EYES SENSITIVE TO LIGHT, BILATERAL: Primary | ICD-10-CM

## 2025-02-17 PROCEDURE — 97165 OT EVAL LOW COMPLEX 30 MIN: CPT | Mod: GO | Performed by: OCCUPATIONAL THERAPIST

## 2025-02-17 PROCEDURE — 97112 NEUROMUSCULAR REEDUCATION: CPT | Mod: GO | Performed by: OCCUPATIONAL THERAPIST
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
## 2025-02-18 ENCOUNTER — TELEPHONE (OUTPATIENT)
Dept: PHYSICAL MEDICINE AND REHAB | Facility: CLINIC | Age: 55
End: 2025-02-18

## 2025-02-18 ENCOUNTER — VIRTUAL VISIT (OUTPATIENT)
Dept: PHYSICAL MEDICINE AND REHAB | Facility: CLINIC | Age: 55
End: 2025-02-18
Payer: COMMERCIAL

## 2025-02-18 VITALS — WEIGHT: 232 LBS | HEIGHT: 69 IN | BODY MASS INDEX: 34.36 KG/M2

## 2025-02-18 DIAGNOSIS — H53.9 VISUAL DISTURBANCE: ICD-10-CM

## 2025-02-18 DIAGNOSIS — S06.0XAA CONCUSSION WITH UNKNOWN LOSS OF CONSCIOUSNESS STATUS, INITIAL ENCOUNTER: ICD-10-CM

## 2025-02-18 DIAGNOSIS — G44.309 POST-CONCUSSION HEADACHE: ICD-10-CM

## 2025-02-18 DIAGNOSIS — S09.90XS HEAD TRAUMA, SEQUELA: Primary | ICD-10-CM

## 2025-02-18 DIAGNOSIS — R20.0 RIGHT FACIAL NUMBNESS: ICD-10-CM

## 2025-02-18 DIAGNOSIS — H53.2 DOUBLE VISION: ICD-10-CM

## 2025-02-18 PROCEDURE — 98006 SYNCH AUDIO-VIDEO EST MOD 30: CPT | Performed by: PHYSICIAN ASSISTANT

## 2025-02-18 RX ORDER — GABAPENTIN 100 MG/1
100 CAPSULE ORAL EVERY MORNING
Qty: 90 CAPSULE | Refills: 1 | Status: SHIPPED | OUTPATIENT
Start: 2025-02-18 | End: 2025-05-19

## 2025-02-18 ASSESSMENT — PAIN SCALES - GENERAL: PAINLEVEL_OUTOF10: MILD PAIN (3)

## 2025-02-18 NOTE — LETTER
[facility] PHYSICAL MEDICINE AND REHABILITATION CLINIC [address]  [address] [facility] [address]  Phone: [phone]  Fax: [phone]    February 18, 2025        Barrera Flores  [address] [facility] [address]          To whom it may concern:    RE: Barrera Flores was seen today, 2/18/25 in the Concussion Clinic. Patient is under care of the concussion clinic and will need to remain off work until further evaluation. Due to on going neurological and vision symptoms patient will need clearance until he can drive. He will be establishing with rehabilitation, and has been referred to Neuro-ophthalmology.      Sincerely,      Meseret Bustamante PA-C

## 2025-02-18 NOTE — NURSING NOTE
Current patient location: [address] [place] [id number]    Is the patient currently in the state of [place]? YES    Visit mode: VIDEO    If the visit is dropped, the patient can be reconnected by:VIDEO VISIT: Text to cell phone:   Telephone Information:   Mobile [phone]       Will anyone else be joining the visit? NO  (If patient encounters technical issues they should call [phone])    Are changes needed to the allergy or medication list? Pt stated no changes to allergies and Pt stated no med changes    Are refills needed on medications prescribed by this physician? NO    Rooming Documentation:  Questionnaire(s) completed    Reason for visit: ARMANI Hyde F

## 2025-02-18 NOTE — TELEPHONE ENCOUNTER
Health Call Center    Phone Message    May a detailed message be left on voicemail: yes     Reason for Call: Susan from [employer] [facility] called.  She wants to know if there is an updated return to work letter yet.  Please call her back to discuss.  Refer to claim # [id number] when you call back

## 2025-02-18 NOTE — PATIENT INSTRUCTIONS
Neuro-ophthalmology  Continue therapies  Add in 100 mg of gabapentin in the morning  Follow up 2 months
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
## 2025-02-19 ENCOUNTER — TELEPHONE (OUTPATIENT)
Dept: PHYSICAL MEDICINE AND REHAB | Facility: CLINIC | Age: 55
End: 2025-02-19

## 2025-02-19 ENCOUNTER — PATIENT OUTREACH (OUTPATIENT)
Dept: CARE COORDINATION | Facility: CLINIC | Age: 55
End: 2025-02-19

## 2025-02-19 ENCOUNTER — THERAPY VISIT (OUTPATIENT)
Dept: SPEECH THERAPY | Facility: REHABILITATION | Age: 55
End: 2025-02-19
Payer: COMMERCIAL

## 2025-02-19 DIAGNOSIS — R41.841 COGNITIVE COMMUNICATION DEFICIT: Primary | ICD-10-CM

## 2025-02-19 PROCEDURE — 92507 TX SP LANG VOICE COMM INDIV: CPT | Mod: GN | Performed by: SPEECH-LANGUAGE PATHOLOGIST

## 2025-02-19 NOTE — TELEPHONE ENCOUNTER
Left Voicemail (1st Attempt) and Sent Mychart (1st Attempt) for the patient to call back and schedule the following:    Appointment type: Return Concussion-Virtual  Provider: Meseret Bustamante  Return date: around 4/18/25  Specialty phone number: [phone]  Additional appointment(s) needed: CT  Additonal Notes: 2 month follow up    Elizabeth Tejada on 2/19/2025 at 3:55 PM

## 2025-02-19 NOTE — TELEPHONE ENCOUNTER
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 Wife sent a screen shot of the letter of yesterday as an update, so nothing more is needed today.    Susan will need an update on workability q 30 days, next appt due in 2 months for concussion f/u so okay to have Susan contact this writer when due for the next workability and I will connect with provider to get updated letter.      Adriana Delgado RN on 2/19/2025 at 4:03 PM

## 2025-02-21 NOTE — TELEPHONE ENCOUNTER
Jordihd out to Susan, had to [facility], stated we still haven't received the form she was to fax over form [employer] regarding this pt. Left our fax number [phone], asked if she could fax it over.

## 2025-02-24 ENCOUNTER — THERAPY VISIT (OUTPATIENT)
Dept: PHYSICAL THERAPY | Facility: REHABILITATION | Age: 55
End: 2025-02-24
Payer: COMMERCIAL

## 2025-02-24 ENCOUNTER — TELEPHONE (OUTPATIENT)
Dept: OPHTHALMOLOGY | Facility: CLINIC | Age: 55
End: 2025-02-24
Payer: COMMERCIAL

## 2025-02-24 ENCOUNTER — TELEPHONE (OUTPATIENT)
Dept: PHYSICAL MEDICINE AND REHAB | Facility: CLINIC | Age: 55
End: 2025-02-24
Payer: COMMERCIAL

## 2025-02-24 DIAGNOSIS — R20.0 RIGHT FACIAL NUMBNESS: ICD-10-CM

## 2025-02-24 DIAGNOSIS — S09.90XS HEAD TRAUMA, SEQUELA: ICD-10-CM

## 2025-02-24 DIAGNOSIS — M54.2 NECK PAIN: Primary | ICD-10-CM

## 2025-02-24 DIAGNOSIS — S06.0XAA CONCUSSION WITH UNKNOWN LOSS OF CONSCIOUSNESS STATUS, INITIAL ENCOUNTER: ICD-10-CM

## 2025-02-24 PROCEDURE — 97110 THERAPEUTIC EXERCISES: CPT | Mod: GP | Performed by: PHYSICAL THERAPIST

## 2025-02-24 PROCEDURE — 97112 NEUROMUSCULAR REEDUCATION: CPT | Mod: GP | Performed by: PHYSICAL THERAPIST

## 2025-02-24 PROCEDURE — 97140 MANUAL THERAPY 1/> REGIONS: CPT | Mod: GP | Performed by: PHYSICAL THERAPIST

## 2025-02-24 NOTE — TELEPHONE ENCOUNTER
Called and spoke to Barrera Landry to schedule in a return neuro with dr. Hall on a Friday preferable       evaluation of monocular diplopia-- review if prism would help pt request.     Simi Zuleta Communication Facilitator on 2/24/2025 at 3:01 PM

## 2025-02-24 NOTE — TELEPHONE ENCOUNTER
Per IB message from Bri Rodriguez on 2/24/25. Assist the pt with scheduling Neuro/optometry . A Connect[employer] and a [employer] message was sent to the pt providing the phone number of [phone].    Neurology does not schedule Neuro/optometry appointments.      Jerica Zamorano on 2/24/2025 at 8:23 AM

## 2025-02-24 NOTE — TELEPHONE ENCOUNTER
Reviewed with Vivi who received call from MARIA L Landry to schedule return neuro with Dr. Hall for evaluation of monocular diplopia-- review if prism would help pt request.    Recommend a Friday when Dr. Trinidad in clinic and available if needed per Dr. Trinidad.    Note to patient communicator to assist in scheduling return neuro with Dr. Hall.    Sridhar Marcano RN 11:06 AM 02/24/25

## 2025-02-24 NOTE — TELEPHONE ENCOUNTER
[facility] Call Center    Phone Message    May a detailed message be left on voicemail: yes     Reason for Call: Other: The pt's wife Vilma states she rcvd a voicemail to call and schedule with Dr. Trinidad for prisms for the pt. Dr. Trinidad doesn't work with prisms. I spoke to Sally in the clinic and she advised to send a TE for clarification. The pt's wife states she has received 2 voice mails about scheduling and it is still not clear what she is to do. Please send a detailed My Chart message to the pt detailing who they should schedule with. Thanks.     Action Taken: Message routed to:  [facility]    Travel Screening: Not Applicable     Date of Service:

## 2025-02-25 NOTE — TELEPHONE ENCOUNTER
Reached out to ANTHONY Cobb 4th times, asking her status of the JORDAN to be  faxed over to our clinic at [phone].

## 2025-02-26 ENCOUNTER — THERAPY VISIT (OUTPATIENT)
Dept: OCCUPATIONAL THERAPY | Facility: REHABILITATION | Age: 55
End: 2025-02-26
Attending: PHYSICIAN ASSISTANT
Payer: COMMERCIAL

## 2025-02-26 DIAGNOSIS — H53.9 VISION CHANGES: ICD-10-CM

## 2025-02-26 DIAGNOSIS — S06.0XAA CONCUSSION WITH UNKNOWN LOSS OF CONSCIOUSNESS STATUS, INITIAL ENCOUNTER: ICD-10-CM

## 2025-02-26 DIAGNOSIS — R20.0 RIGHT FACIAL NUMBNESS: ICD-10-CM

## 2025-02-26 DIAGNOSIS — H53.143 EYES SENSITIVE TO LIGHT, BILATERAL: Primary | ICD-10-CM

## 2025-02-26 DIAGNOSIS — H83.3X3 SOUND SENSITIVITY IN BOTH EARS: ICD-10-CM

## 2025-02-26 DIAGNOSIS — R41.3 MEMORY CHANGES: ICD-10-CM

## 2025-02-26 DIAGNOSIS — H53.9 VISUAL DISTURBANCE: ICD-10-CM

## 2025-02-26 DIAGNOSIS — S09.90XS HEAD TRAUMA, SEQUELA: ICD-10-CM

## 2025-03-03 ENCOUNTER — THERAPY VISIT (OUTPATIENT)
Dept: SPEECH THERAPY | Facility: REHABILITATION | Age: 55
End: 2025-03-03
Payer: COMMERCIAL

## 2025-03-03 ENCOUNTER — THERAPY VISIT (OUTPATIENT)
Dept: PHYSICAL THERAPY | Facility: REHABILITATION | Age: 55
End: 2025-03-03
Payer: COMMERCIAL

## 2025-03-03 DIAGNOSIS — S06.0XAA CONCUSSION WITH UNKNOWN LOSS OF CONSCIOUSNESS STATUS, INITIAL ENCOUNTER: ICD-10-CM

## 2025-03-03 DIAGNOSIS — R41.841 COGNITIVE COMMUNICATION DEFICIT: Primary | ICD-10-CM

## 2025-03-03 DIAGNOSIS — R20.0 RIGHT FACIAL NUMBNESS: ICD-10-CM

## 2025-03-03 DIAGNOSIS — M54.2 NECK PAIN: Primary | ICD-10-CM

## 2025-03-03 DIAGNOSIS — S09.90XS HEAD TRAUMA, SEQUELA: ICD-10-CM

## 2025-03-05 ENCOUNTER — THERAPY VISIT (OUTPATIENT)
Dept: OCCUPATIONAL THERAPY | Facility: REHABILITATION | Age: 55
End: 2025-03-05
Attending: PHYSICIAN ASSISTANT
Payer: COMMERCIAL

## 2025-03-05 DIAGNOSIS — S06.0XAA CONCUSSION WITH UNKNOWN LOSS OF CONSCIOUSNESS STATUS, INITIAL ENCOUNTER: Primary | ICD-10-CM

## 2025-03-05 DIAGNOSIS — H83.3X3 SOUND SENSITIVITY IN BOTH EARS: ICD-10-CM

## 2025-03-05 DIAGNOSIS — R41.3 MEMORY CHANGES: ICD-10-CM

## 2025-03-05 DIAGNOSIS — H53.9 VISUAL DISTURBANCE: ICD-10-CM

## 2025-03-05 DIAGNOSIS — H53.143 EYES SENSITIVE TO LIGHT, BILATERAL: ICD-10-CM

## 2025-03-10 ENCOUNTER — MYC MEDICAL ADVICE (OUTPATIENT)
Dept: PHYSICAL MEDICINE AND REHAB | Facility: CLINIC | Age: 55
End: 2025-03-10
Payer: COMMERCIAL

## 2025-03-10 NOTE — TELEPHONE ENCOUNTER
SITUATION:  Елена of [facility] left a voice mail to writer that she has faxed the JORDAN that Barrera signed to us.  Елена is unable to pay any lost wages without medical records and treatment notes    BACKGROUND:  Letter by Ms Bustamante of 2/18/25 is not sufficient to pay the wages    ASSESSMENT / ACTION:  We have not received/ located JORDAN to get records as [facility] has said it was faxed - needs to deal directly with HIM, perhaps their fax will work for the needed document.    REQUEST / RECOMMENDATION:  Called and spoke to Елена, provided the following info so she can send for the records direrctly to HIM    [place] Medical records contacts  Fax [phone] Phone [phone]    [email or web address]  Medical records request can be obtained through your My Chart .      Елена said she will do f/u with HIM    Adriana Delgado RN on 3/10/2025 at 11:00 AM

## 2025-03-17 ENCOUNTER — THERAPY VISIT (OUTPATIENT)
Dept: SPEECH THERAPY | Facility: REHABILITATION | Age: 55
End: 2025-03-17
Payer: COMMERCIAL

## 2025-03-17 ENCOUNTER — THERAPY VISIT (OUTPATIENT)
Dept: PHYSICAL THERAPY | Facility: REHABILITATION | Age: 55
End: 2025-03-17
Payer: COMMERCIAL

## 2025-03-17 DIAGNOSIS — S06.0XAA CONCUSSION WITH UNKNOWN LOSS OF CONSCIOUSNESS STATUS, INITIAL ENCOUNTER: ICD-10-CM

## 2025-03-17 DIAGNOSIS — M54.2 NECK PAIN: Primary | ICD-10-CM

## 2025-03-17 DIAGNOSIS — S09.90XS HEAD TRAUMA, SEQUELA: ICD-10-CM

## 2025-03-17 DIAGNOSIS — R41.841 COGNITIVE COMMUNICATION DEFICIT: Primary | ICD-10-CM

## 2025-03-17 DIAGNOSIS — R20.0 RIGHT FACIAL NUMBNESS: ICD-10-CM

## 2025-03-17 PROCEDURE — 97110 THERAPEUTIC EXERCISES: CPT | Mod: GP | Performed by: PHYSICAL THERAPIST

## 2025-03-17 PROCEDURE — 92507 TX SP LANG VOICE COMM INDIV: CPT | Mod: GN | Performed by: SPEECH-LANGUAGE PATHOLOGIST

## 2025-03-17 PROCEDURE — 97140 MANUAL THERAPY 1/> REGIONS: CPT | Mod: GP | Performed by: PHYSICAL THERAPIST

## 2025-03-18 ENCOUNTER — TELEPHONE (OUTPATIENT)
Dept: PHYSICAL MEDICINE AND REHAB | Facility: CLINIC | Age: 55
End: 2025-03-18
Payer: COMMERCIAL

## 2025-03-18 NOTE — TELEPHONE ENCOUNTER
[facility] Call Center    Phone Message    May a detailed message be left on voicemail: yes     Reason for Call: Other: Sarah is calling requesting an update on if this patient is still on disability and if he is still treating with us. Please call back to discuss further. Claim # [id number]     Action Taken: Message routed to:  [facility]    Travel Screening: Not Applicable

## 2025-03-18 NOTE — TELEPHONE ENCOUNTER
Returned call and spoke to Sarah, notified her that we still haven't received and JORDAN that was singed from their end. Until this is done, we can't discuss pt's information with them. Sarah said she will fax JORDAN to  [phone], once we receive this we will route to Medical Records to provide requested records.

## 2025-03-19 NOTE — TELEPHONE ENCOUNTER
Received fax from [place] with [facility], requesting records, routed/faxed  the request to our medical [phone] to proceed with the request.

## 2025-03-24 ENCOUNTER — THERAPY VISIT (OUTPATIENT)
Dept: PHYSICAL THERAPY | Facility: REHABILITATION | Age: 55
End: 2025-03-24
Payer: COMMERCIAL

## 2025-03-24 DIAGNOSIS — R20.0 RIGHT FACIAL NUMBNESS: ICD-10-CM

## 2025-03-24 DIAGNOSIS — M54.2 NECK PAIN: Primary | ICD-10-CM

## 2025-03-24 DIAGNOSIS — S06.0XAA CONCUSSION WITH UNKNOWN LOSS OF CONSCIOUSNESS STATUS, INITIAL ENCOUNTER: ICD-10-CM

## 2025-03-24 DIAGNOSIS — S09.90XS HEAD TRAUMA, SEQUELA: ICD-10-CM

## 2025-03-24 PROCEDURE — 97140 MANUAL THERAPY 1/> REGIONS: CPT | Mod: GP | Performed by: PHYSICAL THERAPIST

## 2025-03-24 PROCEDURE — 97112 NEUROMUSCULAR REEDUCATION: CPT | Mod: GP | Performed by: PHYSICAL THERAPIST

## 2025-03-31 ENCOUNTER — THERAPY VISIT (OUTPATIENT)
Dept: PHYSICAL THERAPY | Facility: REHABILITATION | Age: 55
End: 2025-03-31
Payer: COMMERCIAL

## 2025-03-31 ENCOUNTER — THERAPY VISIT (OUTPATIENT)
Dept: SPEECH THERAPY | Facility: REHABILITATION | Age: 55
End: 2025-03-31
Payer: COMMERCIAL

## 2025-03-31 DIAGNOSIS — R41.841 COGNITIVE COMMUNICATION DEFICIT: Primary | ICD-10-CM

## 2025-03-31 DIAGNOSIS — M54.2 NECK PAIN: Primary | ICD-10-CM

## 2025-03-31 DIAGNOSIS — S06.0XAA CONCUSSION WITH UNKNOWN LOSS OF CONSCIOUSNESS STATUS, INITIAL ENCOUNTER: ICD-10-CM

## 2025-03-31 DIAGNOSIS — S09.90XS HEAD TRAUMA, SEQUELA: ICD-10-CM

## 2025-03-31 DIAGNOSIS — R20.0 RIGHT FACIAL NUMBNESS: ICD-10-CM

## 2025-03-31 PROCEDURE — 92507 TX SP LANG VOICE COMM INDIV: CPT | Mod: GN | Performed by: SPEECH-LANGUAGE PATHOLOGIST

## 2025-03-31 PROCEDURE — 97112 NEUROMUSCULAR REEDUCATION: CPT | Mod: GP | Performed by: PHYSICAL THERAPIST
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
## 2025-04-02 ENCOUNTER — MYC MEDICAL ADVICE (OUTPATIENT)
Dept: PHYSICAL MEDICINE AND REHAB | Facility: CLINIC | Age: 55
End: 2025-04-02

## 2025-04-02 ENCOUNTER — THERAPY VISIT (OUTPATIENT)
Dept: OCCUPATIONAL THERAPY | Facility: REHABILITATION | Age: 55
End: 2025-04-02
Payer: COMMERCIAL

## 2025-04-02 DIAGNOSIS — H53.9 VISUAL DISTURBANCE: Primary | ICD-10-CM

## 2025-04-02 DIAGNOSIS — H83.3X3 SOUND SENSITIVITY IN BOTH EARS: ICD-10-CM

## 2025-04-02 DIAGNOSIS — S06.0XAA CONCUSSION WITH UNKNOWN LOSS OF CONSCIOUSNESS STATUS, INITIAL ENCOUNTER: ICD-10-CM

## 2025-04-02 DIAGNOSIS — R41.3 MEMORY CHANGES: ICD-10-CM

## 2025-04-02 DIAGNOSIS — H53.143 EYES SENSITIVE TO LIGHT, BILATERAL: ICD-10-CM

## 2025-04-02 PROCEDURE — 97533 SENSORY INTEGRATION: CPT | Mod: GO | Performed by: OCCUPATIONAL THERAPIST

## 2025-04-02 PROCEDURE — 97112 NEUROMUSCULAR REEDUCATION: CPT | Mod: GO | Performed by: OCCUPATIONAL THERAPIST

## 2025-04-03 ENCOUNTER — APPOINTMENT (OUTPATIENT)
Dept: MRI IMAGING | Facility: CLINIC | Age: 55
End: 2025-04-03
Attending: EMERGENCY MEDICINE
Payer: COMMERCIAL

## 2025-04-03 ENCOUNTER — HOSPITAL ENCOUNTER (INPATIENT)
Facility: CLINIC | Age: 55
End: 2025-04-03
Attending: EMERGENCY MEDICINE
Payer: COMMERCIAL

## 2025-04-03 DIAGNOSIS — G90.50 REFLEX SYMPATHETIC DYSTROPHY: Primary | ICD-10-CM

## 2025-04-03 DIAGNOSIS — H53.2 DIPLOPIA: ICD-10-CM

## 2025-04-03 DIAGNOSIS — E78.2 MIXED HYPERLIPIDEMIA: ICD-10-CM

## 2025-04-03 DIAGNOSIS — G47.33 SLEEP APNEA, OBSTRUCTIVE: ICD-10-CM

## 2025-04-03 DIAGNOSIS — S06.9XAD TRAUMATIC BRAIN INJURY, WITH UNKNOWN LOSS OF CONSCIOUSNESS STATUS, SUBSEQUENT ENCOUNTER: ICD-10-CM

## 2025-04-03 DIAGNOSIS — I63.9 ACUTE ISCHEMIC STROKE (H): ICD-10-CM

## 2025-04-03 LAB
ANION GAP SERPL CALCULATED.3IONS-SCNC: 15 MMOL/L (ref 7–15)
ATRIAL RATE - MUSE: 79 BPM
BASOPHILS # BLD AUTO: 0.1 10E3/UL (ref 0–0.2)
BASOPHILS NFR BLD AUTO: 1 %
BUN SERPL-MCNC: 13 MG/DL (ref 6–20)
CALCIUM SERPL-MCNC: 10.1 MG/DL (ref 8.8–10.4)
CHLORIDE SERPL-SCNC: 101 MMOL/L (ref 98–107)
CREAT SERPL-MCNC: 0.99 MG/DL (ref 0.67–1.17)
DIASTOLIC BLOOD PRESSURE - MUSE: 70 MMHG
EGFRCR SERPLBLD CKD-EPI 2021: >90 ML/MIN/1.73M2
EOSINOPHIL # BLD AUTO: 0.9 10E3/UL (ref 0–0.7)
EOSINOPHIL NFR BLD AUTO: 7 %
ERYTHROCYTE [DISTWIDTH] IN BLOOD BY AUTOMATED COUNT: 14.6 % (ref 10–15)
GLUCOSE SERPL-MCNC: 119 MG/DL (ref 70–99)
HCO3 SERPL-SCNC: 24 MMOL/L (ref 22–29)
HCT VFR BLD AUTO: 49.7 % (ref 40–53)
HGB BLD-MCNC: 17.1 G/DL (ref 13.3–17.7)
HOLD SPECIMEN: NORMAL
IMM GRANULOCYTES # BLD: 0.1 10E3/UL
IMM GRANULOCYTES NFR BLD: 1 %
INTERPRETATION ECG - MUSE: NORMAL
LYMPHOCYTES # BLD AUTO: 2.9 10E3/UL (ref 0.8–5.3)
LYMPHOCYTES NFR BLD AUTO: 23 %
MAGNESIUM SERPL-MCNC: 2.3 MG/DL (ref 1.7–2.3)
MCH RBC QN AUTO: 29.3 PG (ref 26.5–33)
MCHC RBC AUTO-ENTMCNC: 34.4 G/DL (ref 31.5–36.5)
MCV RBC AUTO: 85 FL (ref 78–100)
MONOCYTES # BLD AUTO: 1.2 10E3/UL (ref 0–1.3)
MONOCYTES NFR BLD AUTO: 9 %
NEUTROPHILS # BLD AUTO: 7.8 10E3/UL (ref 1.6–8.3)
NEUTROPHILS NFR BLD AUTO: 60 %
NRBC # BLD AUTO: 0 10E3/UL
NRBC BLD AUTO-RTO: 0 /100
P AXIS - MUSE: 31 DEGREES
PLATELET # BLD AUTO: 214 10E3/UL (ref 150–450)
POTASSIUM SERPL-SCNC: 4 MMOL/L (ref 3.4–5.3)
PR INTERVAL - MUSE: 202 MS
QRS DURATION - MUSE: 92 MS
QT - MUSE: 412 MS
QTC - MUSE: 472 MS
R AXIS - MUSE: 65 DEGREES
RBC # BLD AUTO: 5.83 10E6/UL (ref 4.4–5.9)
SODIUM SERPL-SCNC: 140 MMOL/L (ref 135–145)
SYSTOLIC BLOOD PRESSURE - MUSE: 110 MMHG
T AXIS - MUSE: 44 DEGREES
VENTRICULAR RATE- MUSE: 79 BPM
WBC # BLD AUTO: 12.9 10E3/UL (ref 4–11)

## 2025-04-03 PROCEDURE — 36415 COLL VENOUS BLD VENIPUNCTURE: CPT | Performed by: EMERGENCY MEDICINE

## 2025-04-03 PROCEDURE — 83036 HEMOGLOBIN GLYCOSYLATED A1C: CPT

## 2025-04-03 PROCEDURE — 84484 ASSAY OF TROPONIN QUANT: CPT

## 2025-04-03 PROCEDURE — 93005 ELECTROCARDIOGRAM TRACING: CPT

## 2025-04-03 PROCEDURE — 83735 ASSAY OF MAGNESIUM: CPT

## 2025-04-03 PROCEDURE — 85025 COMPLETE CBC W/AUTO DIFF WBC: CPT

## 2025-04-03 PROCEDURE — 80048 BASIC METABOLIC PNL TOTAL CA: CPT

## 2025-04-03 PROCEDURE — 70553 MRI BRAIN STEM W/O & W/DYE: CPT

## 2025-04-03 PROCEDURE — 250N000013 HC RX MED GY IP 250 OP 250 PS 637

## 2025-04-03 PROCEDURE — 70544 MR ANGIOGRAPHY HEAD W/O DYE: CPT

## 2025-04-03 PROCEDURE — 255N000002 HC RX 255 OP 636: Performed by: EMERGENCY MEDICINE

## 2025-04-03 PROCEDURE — 120N000004 HC R&B MS OVERFLOW

## 2025-04-03 PROCEDURE — 82310 ASSAY OF CALCIUM: CPT

## 2025-04-03 PROCEDURE — 85014 HEMATOCRIT: CPT

## 2025-04-03 PROCEDURE — 70549 MR ANGIOGRAPH NECK W/O&W/DYE: CPT

## 2025-04-03 PROCEDURE — 82465 ASSAY BLD/SERUM CHOLESTEROL: CPT

## 2025-04-03 PROCEDURE — 99285 EMERGENCY DEPT VISIT HI MDM: CPT | Mod: 25

## 2025-04-03 PROCEDURE — A9585 GADOBUTROL INJECTION: HCPCS | Performed by: EMERGENCY MEDICINE

## 2025-04-03 RX ORDER — ONDANSETRON 4 MG/1
4 TABLET, ORALLY DISINTEGRATING ORAL EVERY 6 HOURS PRN
Status: ACTIVE | OUTPATIENT
Start: 2025-04-03

## 2025-04-03 RX ORDER — CLOPIDOGREL BISULFATE 75 MG/1
300 TABLET ORAL ONCE
Status: COMPLETED | OUTPATIENT
Start: 2025-04-03 | End: 2025-04-03

## 2025-04-03 RX ORDER — CALCIUM CARBONATE 500 MG/1
1000 TABLET, CHEWABLE ORAL 4 TIMES DAILY PRN
Status: ACTIVE | OUTPATIENT
Start: 2025-04-03

## 2025-04-03 RX ORDER — ATORVASTATIN CALCIUM 40 MG/1
40 TABLET, FILM COATED ORAL EVERY EVENING
Status: DISPENSED | OUTPATIENT
Start: 2025-04-04

## 2025-04-03 RX ORDER — AMOXICILLIN 250 MG
2 CAPSULE ORAL 2 TIMES DAILY PRN
Status: ACTIVE | OUTPATIENT
Start: 2025-04-03

## 2025-04-03 RX ORDER — LIDOCAINE 40 MG/G
CREAM TOPICAL
Status: ACTIVE | OUTPATIENT
Start: 2025-04-03

## 2025-04-03 RX ORDER — ACETAMINOPHEN 325 MG/1
650 TABLET ORAL EVERY 4 HOURS PRN
Status: ACTIVE | OUTPATIENT
Start: 2025-04-03

## 2025-04-03 RX ORDER — GADOBUTROL 604.72 MG/ML
10 INJECTION INTRAVENOUS ONCE
Status: COMPLETED | OUTPATIENT
Start: 2025-04-03 | End: 2025-04-03

## 2025-04-03 RX ORDER — ASPIRIN 81 MG/1
81 TABLET ORAL DAILY
Status: ACTIVE | OUTPATIENT
Start: 2025-04-04

## 2025-04-03 RX ORDER — ASPIRIN 81 MG/1
81 TABLET, CHEWABLE ORAL DAILY
Status: DISPENSED | OUTPATIENT
Start: 2025-04-04

## 2025-04-03 RX ORDER — ACETAMINOPHEN 325 MG/10.15ML
650 LIQUID ORAL EVERY 4 HOURS PRN
Status: ACTIVE | OUTPATIENT
Start: 2025-04-03

## 2025-04-03 RX ORDER — CLOPIDOGREL BISULFATE 75 MG/1
300 TABLET ORAL ONCE
Status: DISCONTINUED | OUTPATIENT
Start: 2025-04-04 | End: 2025-04-04

## 2025-04-03 RX ORDER — AMOXICILLIN 250 MG
1 CAPSULE ORAL 2 TIMES DAILY PRN
Status: ACTIVE | OUTPATIENT
Start: 2025-04-03

## 2025-04-03 RX ORDER — PROCHLORPERAZINE MALEATE 10 MG
10 TABLET ORAL EVERY 6 HOURS PRN
Status: ACTIVE | OUTPATIENT
Start: 2025-04-03

## 2025-04-03 RX ORDER — ACETAMINOPHEN 650 MG/1
650 SUPPOSITORY RECTAL EVERY 4 HOURS PRN
Status: ACTIVE | OUTPATIENT
Start: 2025-04-03

## 2025-04-03 RX ORDER — CLOPIDOGREL BISULFATE 75 MG/1
75 TABLET ORAL DAILY
Status: DISCONTINUED | OUTPATIENT
Start: 2025-04-04 | End: 2025-04-04

## 2025-04-03 RX ORDER — ONDANSETRON 2 MG/ML
4 INJECTION INTRAMUSCULAR; INTRAVENOUS EVERY 6 HOURS PRN
Status: ACTIVE | OUTPATIENT
Start: 2025-04-03

## 2025-04-03 RX ADMIN — CLOPIDOGREL BISULFATE 300 MG: 75 TABLET ORAL at 23:32

## 2025-04-03 RX ADMIN — GADOBUTROL 10 ML: 604.72 INJECTION INTRAVENOUS at 22:17

## 2025-04-03 ASSESSMENT — COLUMBIA-SUICIDE SEVERITY RATING SCALE - C-SSRS
1. IN THE PAST MONTH, HAVE YOU WISHED YOU WERE DEAD OR WISHED YOU COULD GO TO SLEEP AND NOT WAKE UP?: NO
2. HAVE YOU ACTUALLY HAD ANY THOUGHTS OF KILLING YOURSELF IN THE PAST MONTH?: NO
6. HAVE YOU EVER DONE ANYTHING, STARTED TO DO ANYTHING, OR PREPARED TO DO ANYTHING TO END YOUR LIFE?: NO

## 2025-04-03 ASSESSMENT — ACTIVITIES OF DAILY LIVING (ADL)
ADLS_ACUITY_SCORE: 41

## 2025-04-03 NOTE — TELEPHONE ENCOUNTER
"See note to Barrera from Ms. Dianna MADDOX in [facility].    Provider discussed with writer at 4:15 pm that MRI may be needed to check for syrinx in brain stem / C spine to assess for increasing frequency of progressively descending neurological Sx.  Due to length of time to get urgent imaging as outpatient (up to a week) and Neurology consult (more than a week) pt needs to be evaluated face to face chidi    Called to pt as he has not read note yet    Barrera initially felt he had adequate imaging done already, then came to understand that his imaging was done on 12/20/24 which is about a month prior to the onset of \"occasional\" symptoms described in writer's note below and it is not possible to diagnose what is happening over a phone call or video visit.    Barrera verbalized that when his wife gets back home he will ask her to take him to [facility] MAHENDRA Delgado RN on 4/3/2025 at 5:31 PM    "

## 2025-04-03 NOTE — TELEPHONE ENCOUNTER
"Meseret Bustamante PA-C  You3 hours ago (9:20 AM)     This sounds like nerve pain. He will need to see Neurology for the evaluation of his pain that is likely nerve pain.  We can get him to the facial pain clinic. How long does the pain last?    Meseret Bustamante PA-C     You  Meseret Bustamante PA-C4 hours ago (9:02 AM)     SB  Please advise re NEW Sx: \"extreme dizziness and it started as a tingle but now it has turned into a burning feeling that runs down his face and chest. He says it feels like when they put dye into you for a ct scan.\"  I could not reach pt, to get more info, left a message, also he declined to book neuro optometry / SB       Spoke to pt this afternoon.  Triggers: sometimes starts when I am sitting in the car, mostly happens when rising up from sitting to stand, anticipates Sx so always holds on to something to prevent falling (has never fallen) used to be \"tingling\" now onset is burning sensation, same as the dye feeling of the IV contrast  for imaging test.    Onset - feels on forehead first, rapidly spreads down through full face, neck and into chest.  Sensation has progressed from just face/ neck , then later episodes the burning sensation went down farther into torso and shoulders & arms, recently the sensation is going as far as nipple line and in past two days is below the nipple line and on arms is reaching down to the level of both elbows.  States \"there is a distinct line you cold draw to show across torso and arms for where the sensation stops.\"    \"Feels like all if the blood is rushing out of (my)his head during these spells\"    Frequency: started in maybe middle January and has been increasing in frequency and intensity and regionally for 10 + weeks  Currently states it happens 60 - 70% of the time he stands up, yes, this is a daily experience - typically 4 or 5 times each day this week  The episodes now last about 5 seconds for the burning and stays dizzy then for another 5 " seconds    NO rash or color change of skin is present    palsy present ?- wife notes twitching mostly on the left side of face sometimes during these episodes  Additionally reporting hand tremors, L > right that are increasing to a daily frequency, had trouble using his silverware last weekend  These tremors are NOT associated with the dizzy + burning spells    Eyes: continues with all problems currently addressed in OT tried to book the second NeuroOptometry appt with Dr Trinidad as requested at last visit with Ms Bustamante, after several calls to that clinic, they would only book him for a second visit with Ms. Hall so he saw her again on March 7    Has not checked blood pressure before, during or following these spells.      TO DO:  Writer explained that Ms. Kalyan Echevarria has reviewed this complaint this morning and is considering a neurology consultation  Writer will update concussion provider with these new details and wait for new orders.  Writer will explore how to have Barrera seen by provider Vivi as was requested to be done 7 weeks ago.    Adriana Delgado RN on 4/3/2025 at 2:16 PM
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
## 2025-04-04 ENCOUNTER — APPOINTMENT (OUTPATIENT)
Dept: CARDIOLOGY | Facility: CLINIC | Age: 55
End: 2025-04-04
Payer: COMMERCIAL

## 2025-04-04 ENCOUNTER — APPOINTMENT (OUTPATIENT)
Dept: PHYSICAL THERAPY | Facility: CLINIC | Age: 55
End: 2025-04-04
Payer: COMMERCIAL

## 2025-04-04 ENCOUNTER — APPOINTMENT (OUTPATIENT)
Dept: OCCUPATIONAL THERAPY | Facility: CLINIC | Age: 55
End: 2025-04-04
Payer: COMMERCIAL

## 2025-04-04 VITALS
SYSTOLIC BLOOD PRESSURE: 109 MMHG | HEART RATE: 85 BPM | HEIGHT: 69 IN | WEIGHT: 235.1 LBS | BODY MASS INDEX: 34.82 KG/M2 | DIASTOLIC BLOOD PRESSURE: 67 MMHG | OXYGEN SATURATION: 95 % | RESPIRATION RATE: 18 BRPM | TEMPERATURE: 97.6 F

## 2025-04-04 VITALS
DIASTOLIC BLOOD PRESSURE: 76 MMHG | WEIGHT: 239.5 LBS | RESPIRATION RATE: 21 BRPM | BODY MASS INDEX: 35.47 KG/M2 | TEMPERATURE: 97.1 F | HEART RATE: 80 BPM | OXYGEN SATURATION: 90 % | HEIGHT: 69 IN | SYSTOLIC BLOOD PRESSURE: 122 MMHG

## 2025-04-04 LAB
ANION GAP SERPL CALCULATED.3IONS-SCNC: 14 MMOL/L (ref 7–15)
BUN SERPL-MCNC: 13.3 MG/DL (ref 6–20)
CALCIUM SERPL-MCNC: 9.2 MG/DL (ref 8.8–10.4)
CHLORIDE SERPL-SCNC: 103 MMOL/L (ref 98–107)
CHOLEST SERPL-MCNC: 254 MG/DL
CREAT SERPL-MCNC: 0.87 MG/DL (ref 0.67–1.17)
EGFRCR SERPLBLD CKD-EPI 2021: >90 ML/MIN/1.73M2
ERYTHROCYTE [DISTWIDTH] IN BLOOD BY AUTOMATED COUNT: 14.6 % (ref 10–15)
EST. AVERAGE GLUCOSE BLD GHB EST-MCNC: 114 MG/DL
GLUCOSE SERPL-MCNC: 97 MG/DL (ref 70–99)
HBA1C MFR BLD: 5.6 %
HCO3 SERPL-SCNC: 21 MMOL/L (ref 22–29)
HCT VFR BLD AUTO: 42.8 % (ref 40–53)
HDLC SERPL-MCNC: 49 MG/DL
HGB BLD-MCNC: 15.4 G/DL (ref 13.3–17.7)
LDLC SERPL CALC-MCNC: 170 MG/DL
LVEF ECHO: NORMAL
MCH RBC QN AUTO: 29.7 PG (ref 26.5–33)
MCHC RBC AUTO-ENTMCNC: 36 G/DL (ref 31.5–36.5)
MCV RBC AUTO: 83 FL (ref 78–100)
NONHDLC SERPL-MCNC: 205 MG/DL
PLATELET # BLD AUTO: 165 10E3/UL (ref 150–450)
POTASSIUM SERPL-SCNC: 3.4 MMOL/L (ref 3.4–5.3)
RBC # BLD AUTO: 5.19 10E6/UL (ref 4.4–5.9)
SODIUM SERPL-SCNC: 138 MMOL/L (ref 135–145)
TRIGL SERPL-MCNC: 174 MG/DL
TROPONIN T SERPL HS-MCNC: 7 NG/L
WBC # BLD AUTO: 11.7 10E3/UL (ref 4–11)

## 2025-04-04 PROCEDURE — 97161 PT EVAL LOW COMPLEX 20 MIN: CPT | Mod: GP

## 2025-04-04 PROCEDURE — 97535 SELF CARE MNGMENT TRAINING: CPT | Mod: GO

## 2025-04-04 PROCEDURE — C8929 TTE W OR WO FOL WCON,DOPPLER: HCPCS

## 2025-04-04 PROCEDURE — 999N000226 HC STATISTIC SLP IP EVAL DEFER

## 2025-04-04 PROCEDURE — 85027 COMPLETE CBC AUTOMATED: CPT

## 2025-04-04 PROCEDURE — G0378 HOSPITAL OBSERVATION PER HR: HCPCS

## 2025-04-04 PROCEDURE — G0426 INPT/ED TELECONSULT50: HCPCS | Mod: G0

## 2025-04-04 PROCEDURE — 250N000013 HC RX MED GY IP 250 OP 250 PS 637

## 2025-04-04 PROCEDURE — 80048 BASIC METABOLIC PNL TOTAL CA: CPT

## 2025-04-04 PROCEDURE — 97116 GAIT TRAINING THERAPY: CPT | Mod: GP

## 2025-04-04 PROCEDURE — 999N000208 ECHOCARDIOGRAM COMPLETE

## 2025-04-04 PROCEDURE — 97165 OT EVAL LOW COMPLEX 30 MIN: CPT | Mod: GO

## 2025-04-04 PROCEDURE — 255N000002 HC RX 255 OP 636

## 2025-04-04 PROCEDURE — 250N000013 HC RX MED GY IP 250 OP 250 PS 637: Performed by: STUDENT IN AN ORGANIZED HEALTH CARE EDUCATION/TRAINING PROGRAM

## 2025-04-04 PROCEDURE — 93306 TTE W/DOPPLER COMPLETE: CPT | Mod: 26 | Performed by: INTERNAL MEDICINE

## 2025-04-04 PROCEDURE — 36415 COLL VENOUS BLD VENIPUNCTURE: CPT

## 2025-04-04 RX ORDER — ASPIRIN 81 MG/1
81 TABLET ORAL DAILY
Status: ON HOLD | COMMUNITY
End: 2025-04-04

## 2025-04-04 RX ORDER — GABAPENTIN 300 MG/1
300 CAPSULE ORAL ONCE
Status: COMPLETED | OUTPATIENT
Start: 2025-04-04 | End: 2025-04-04

## 2025-04-04 RX ORDER — GABAPENTIN 100 MG/1
100 CAPSULE ORAL EVERY MORNING
Status: DISCONTINUED | OUTPATIENT
Start: 2025-04-05 | End: 2025-04-04

## 2025-04-04 RX ORDER — GABAPENTIN 300 MG/1
300 CAPSULE ORAL AT BEDTIME
Status: DISCONTINUED | OUTPATIENT
Start: 2025-04-04 | End: 2025-04-04

## 2025-04-04 RX ORDER — ATORVASTATIN CALCIUM 40 MG/1
80 TABLET, FILM COATED ORAL EVERY EVENING
Status: DISCONTINUED | OUTPATIENT
Start: 2025-04-04 | End: 2025-04-04 | Stop reason: HOSPADM

## 2025-04-04 RX ORDER — BUPROPION HYDROCHLORIDE 150 MG/1
300 TABLET ORAL EVERY MORNING
Status: DISCONTINUED | OUTPATIENT
Start: 2025-04-04 | End: 2025-04-04 | Stop reason: HOSPADM

## 2025-04-04 RX ORDER — CLOPIDOGREL BISULFATE 75 MG/1
75 TABLET ORAL DAILY
Status: ACTIVE | OUTPATIENT
Start: 2025-04-04

## 2025-04-04 RX ORDER — BUPROPION HYDROCHLORIDE 300 MG/1
300 TABLET ORAL EVERY MORNING
COMMUNITY

## 2025-04-04 RX ORDER — GABAPENTIN 100 MG/1
100 CAPSULE ORAL EVERY MORNING
Status: DISCONTINUED | OUTPATIENT
Start: 2025-04-04 | End: 2025-04-04

## 2025-04-04 RX ORDER — ATORVASTATIN CALCIUM 40 MG/1
40 TABLET, FILM COATED ORAL EVERY EVENING
Qty: 30 TABLET | Refills: 0 | Status: SHIPPED | OUTPATIENT
Start: 2025-04-04 | End: 2025-04-04 | Stop reason: DRUGHIGH

## 2025-04-04 RX ORDER — ALLOPURINOL 300 MG/1
300 TABLET ORAL DAILY
Status: DISCONTINUED | OUTPATIENT
Start: 2025-04-04 | End: 2025-04-04 | Stop reason: HOSPADM

## 2025-04-04 RX ORDER — ONDANSETRON 4 MG/1
4 TABLET, ORALLY DISINTEGRATING ORAL EVERY 8 HOURS PRN
COMMUNITY

## 2025-04-04 RX ORDER — GABAPENTIN 300 MG/1
300 CAPSULE ORAL AT BEDTIME
COMMUNITY

## 2025-04-04 RX ORDER — GABAPENTIN 300 MG/1
300 CAPSULE ORAL 2 TIMES DAILY
Status: DISCONTINUED | OUTPATIENT
Start: 2025-04-04 | End: 2025-04-04

## 2025-04-04 RX ORDER — FUROSEMIDE 40 MG/1
40 TABLET ORAL DAILY
Status: DISCONTINUED | OUTPATIENT
Start: 2025-04-04 | End: 2025-04-04 | Stop reason: HOSPADM

## 2025-04-04 RX ORDER — LOSARTAN POTASSIUM 50 MG/1
100 TABLET ORAL DAILY
Status: DISCONTINUED | OUTPATIENT
Start: 2025-04-04 | End: 2025-04-04 | Stop reason: HOSPADM

## 2025-04-04 RX ORDER — GABAPENTIN 300 MG/1
300 CAPSULE ORAL AT BEDTIME
Status: DISCONTINUED | OUTPATIENT
Start: 2025-04-05 | End: 2025-04-04 | Stop reason: HOSPADM

## 2025-04-04 RX ORDER — BUPROPION HYDROCHLORIDE 150 MG/1
300 TABLET ORAL EVERY MORNING
Status: DISCONTINUED | OUTPATIENT
Start: 2025-04-05 | End: 2025-04-04

## 2025-04-04 RX ORDER — AMOXICILLIN 250 MG
1 CAPSULE ORAL DAILY
COMMUNITY

## 2025-04-04 RX ORDER — FUROSEMIDE 40 MG/1
40 TABLET ORAL DAILY PRN
COMMUNITY

## 2025-04-04 RX ORDER — ATORVASTATIN CALCIUM 80 MG/1
80 TABLET, FILM COATED ORAL EVERY EVENING
Qty: 60 TABLET | Refills: 0 | Status: SHIPPED | OUTPATIENT
Start: 2025-04-04

## 2025-04-04 RX ORDER — HYDROCHLOROTHIAZIDE 12.5 MG/1
12.5 TABLET ORAL DAILY
COMMUNITY

## 2025-04-04 RX ORDER — SENNOSIDES 8.6 MG
325 CAPSULE ORAL DAILY
Qty: 60 TABLET | Refills: 0 | Status: SHIPPED | OUTPATIENT
Start: 2025-04-04

## 2025-04-04 RX ORDER — ASPIRIN 81 MG/1
81 TABLET ORAL DAILY
Status: DISCONTINUED | OUTPATIENT
Start: 2025-04-05 | End: 2025-04-04 | Stop reason: HOSPADM

## 2025-04-04 RX ORDER — GABAPENTIN 100 MG/1
100 CAPSULE ORAL EVERY MORNING
Status: DISCONTINUED | OUTPATIENT
Start: 2025-04-04 | End: 2025-04-04 | Stop reason: HOSPADM

## 2025-04-04 RX ORDER — HYDROCHLOROTHIAZIDE 12.5 MG/1
12.5 CAPSULE ORAL DAILY
Status: DISCONTINUED | OUTPATIENT
Start: 2025-04-04 | End: 2025-04-04 | Stop reason: HOSPADM

## 2025-04-04 RX ADMIN — PERFLUTREN 2 ML: 6.52 INJECTION, SUSPENSION INTRAVENOUS at 11:00

## 2025-04-04 RX ADMIN — ASPIRIN 81 MG: 81 TABLET ORAL at 01:15

## 2025-04-04 RX ADMIN — GABAPENTIN 100 MG: 100 CAPSULE ORAL at 12:27

## 2025-04-04 RX ADMIN — LOSARTAN POTASSIUM 100 MG: 50 TABLET, FILM COATED ORAL at 11:15

## 2025-04-04 RX ADMIN — HYDROCHLOROTHIAZIDE 12.5 MG: 12.5 CAPSULE ORAL at 11:16

## 2025-04-04 RX ADMIN — ALLOPURINOL 300 MG: 300 TABLET ORAL at 11:15

## 2025-04-04 RX ADMIN — ATORVASTATIN CALCIUM 40 MG: 40 TABLET, FILM COATED ORAL at 01:15

## 2025-04-04 RX ADMIN — ACETAMINOPHEN 650 MG: 325 TABLET ORAL at 08:11

## 2025-04-04 RX ADMIN — GABAPENTIN 300 MG: 300 CAPSULE ORAL at 02:19

## 2025-04-04 RX ADMIN — BUPROPION HYDROCHLORIDE 300 MG: 150 TABLET, EXTENDED RELEASE ORAL at 11:15

## 2025-04-04 ASSESSMENT — ACTIVITIES OF DAILY LIVING (ADL)
CHANGE_IN_FUNCTIONAL_STATUS_SINCE_ONSET_OF_CURRENT_ILLNESS/INJURY: NO
NUMBER_OF_TIMES_PATIENT_HAS_FALLEN_WITHIN_LAST_SIX_MONTHS: 3
ADLS_ACUITY_SCORE: 40
ADLS_ACUITY_SCORE: 32
ADLS_ACUITY_SCORE: 34
DIFFICULTY_EATING/SWALLOWING: NO
ADLS_ACUITY_SCORE: 29
ADLS_ACUITY_SCORE: 29
ADLS_ACUITY_SCORE: 34
CONCENTRATING,_REMEMBERING_OR_MAKING_DECISIONS_DIFFICULTY: YES
DRESSING/BATHING_DIFFICULTY: NO
ADLS_ACUITY_SCORE: 34
WALKING_OR_CLIMBING_STAIRS: AMBULATION DIFFICULTY, REQUIRES EQUIPMENT
ADLS_ACUITY_SCORE: 34
ADLS_ACUITY_SCORE: 29
ADLS_ACUITY_SCORE: 32
ADLS_ACUITY_SCORE: 34
ADLS_ACUITY_SCORE: 32
WALKING_OR_CLIMBING_STAIRS_DIFFICULTY: YES
ADLS_ACUITY_SCORE: 32
ADLS_ACUITY_SCORE: 34
VISION_MANAGEMENT: GLASSES
ADLS_ACUITY_SCORE: 41
WEAR_GLASSES_OR_BLIND: YES
DOING_ERRANDS_INDEPENDENTLY_DIFFICULTY: YES
DIFFICULTY_COMMUNICATING: OTHER (SEE COMMENTS)
HEARING_DIFFICULTY_OR_DEAF: NO
FALL_HISTORY_WITHIN_LAST_SIX_MONTHS: YES
ADLS_ACUITY_SCORE: 41
TOILETING_ISSUES: NO
ADLS_ACUITY_SCORE: 41

## 2025-04-04 NOTE — CONSULTS
"  [facility]    Stroke Telephone Note    I was called by Shiva Cavanaugh on 04/03/25 regarding patient Barrera Flores. The patient is a 54 year old male with history of HTN, obesity, sleep apnea who presented with tingling sensation in his forehead that radiates to his whole body lasting about 15 seconds, this has become more frequent in the last few weeks. Occasional headache and dizziness since his TBI 3 months ago. No true acute symptoms leading to this presentation, no focal deficits on exam.     Vitals  BP: 108/71   Pulse: 81   Resp: 18   Temp: 97.1  F (36.2  C)   Weight: 108.6 kg (239 lb 8 oz)    Imaging Findings  HEAD MRI:  1.  Small linear cleft of acute to early subacute ischemic infarction in the left frontal centrum semiovale.  2.  Chronic lacunar infarct right cerebellum.     HEAD MRA:  No stenosis/occlusion, aneurysm, or high flow vascular malformation.     NECK MRA:  No measurable stenosis or dissection.    Impression  Subacute left centrum semiovale infarct    Recommendations  - Use orderset: \"Ischemic Stroke Routine Admission\"   - Place Neurology IP Stroke Consult order   - Daily aspirin 81 mg for secondary stroke prevention  - Plavix 300 mg load followed by 75 mg daily x 21 days  - Statin: Lipitor 40 mg nightly  - MRI Brain with and without contrast   - TTE (with Bubble Study if age 60 yrs or less)  - Neurochecks and Vital Signs every 4 hrs   - Permissive HTN; goal SBP < 220 mmHg  - Telemetry, EKG  - Bedside Glucose Monitoring  - A1c, Lipid Panel, Troponin x 3  - PT/OT/SLP  - Stroke Education  - Euthermia, Euglycemia    My recommendations are based on the information provided over the phone by Barrera lFores's in-person providers. They are not intended to replace the clinical judgment of his in-person providers. I was not requested to personally see or examine the patient at this time.     Qamar Herbert MD       Department of Neurology     " "  Securely message with the [facility] Web Console (learn more here)   To page me or covering stroke neurology team member, click here: AMCOM  Choose \"On Call\" tab at top, then select \"NEUROLOGY/ALL SITES\" from middle drop-down box, press Enter, then look for \"stroke\" or \"telestroke\" for your site.    "
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
## 2025-04-04 NOTE — PROGRESS NOTES
Speech-Language Pathology  SLP orders received. Chart reviewed and discussed with care team. Speech-Language Pathology evaluations not indicated due to pt & RN reporting no new deficits with speech, language, or cognition. Patient tolerating current diet of regular and thin with no overt s/s aspiration per RN or pt report. Pt with baseline cognitive-linguistic deficits, which pt & pt's wife endorse haven't changed from baseline. No acute SLP needs identified. Defer discharge recommendations to treatment team. Recommend pt resumes OP speech therapy upon discharge. SLP will complete orders. Please reconsult if new concerns arise.

## 2025-04-04 NOTE — ED PROVIDER NOTES
"Emergency Department Encounter   NAME: Barrera Flores ; AGE: 54 year old male ; YOB: 1970 ; MRN: [id number] ; PCP: Chris Jim   ED PROVIDER: Mouna Mercer PA-C    Evaluation Date & Time:   4/3/2025  7:12 PM    CHIEF COMPLAINT:  Dizziness and Headache      Impression and Plan   MDM: Barrera Flores is a 54 year old male who presents to the ED for evaluation of ***.     Patient is vitally normal ***.  Physical exam is significant for ***.  Differential diagnosis includes ***.     I independently reviewed and interpreted all labs and imaging;  Labs show ***.  EKG shows ***.   ***CT/XR/US and see ***    On reevaluation, ***.    Consulted *** who recommends ***.     Return precautions to the ED were discussed, *** verbalized understanding and were agreeable with the plan. All questions were answered.     Per chart review:  -  - Recent labs and imaging reviewed  - Care everywhere reviewed    Medical Decision Making  {DID YOU REMEMBER TO DOCUMENT...?:110125}  {ADMIT VS D/C:567340}    MIPS (CTPE, Dental pain, Blount, Sinusitis, Asthma/COPD, Head Trauma): {ECC MIPS DOCUMENTATION:926546}    SEPSIS: {Sepsis/Stemi/Stroke:721095::\"None\"}        ED COURSE:  7:58 PM I met and introduced myself to the patient. I gathered initial history and performed my physical exam. We discussed plan for initial workup.   8:44 PM I have staffed the patient with Dr. Javier, ED MD, who has evaluated the patient and agrees with all aspects of today's care.   *** I rechecked the patient and discussed results, discharge, follow up, and reasons to return to the ED.       FINAL IMPRESSION:  No diagnosis found.      MEDICATIONS GIVEN IN THE EMERGENCY DEPARTMENT:  Medications - No data to display      NEW PRESCRIPTIONS STARTED AT TODAY'S ED VISIT:  New Prescriptions    No medications on file         HPI   Use of Intrepreter: N/A     Barrera Flores is a 54 year old male with a pertinent history of *** who presents to the ED " by *** for evaluation of ***.       REVIEW OF SYSTEMS:  Pertinent positive and negative symptoms per HPI.       Medical History     Past Medical History:   Diagnosis Date    Chronic gouty arthropathy     Eczema 12/3/2018    Essential hypertension 8/19/2016    Hepatic steatosis 9/21/2018    Low testosterone in male 9/5/2018    Mixed hyperlipidemia     Osteoarthritis of both hips 12/1/2015    Prediabetes 9/25/2018    PTSD (post-traumatic stress disorder) 4/6/2018    Reflex sympathetic dystrophy     Sleep apnea, obstructive 8/19/2016       Past Surgical History:   Procedure Laterality Date    ARTHRODESIS WRIST Left 2003    ARTHROPLASTY KNEE      COLONOSCOPY  2016    [facility] REMOVAL GALLBLADDER      Description: Cholecystectomy;  Recorded: 10/24/2012;    HERNIA REPAIR, UMBILICAL      Herniorrhaphy    OTHER SURGICAL HISTORY Right 09/28/2015    OTHER SURGICAL [id number] lipoma excision from right wrist     IN ARTHRODESIS ANT INTERBODY MIN DISCECTOMY,LUMBAR      Description: Lumbar Vertebral Fusion;  Proc Date: 04/23/1995;  Comments: L1-L2    IN SYMPATHECTOMY,LUMBAR      Description: Surgical Sympathectomy Lumbar;  Recorded: 10/24/2012;    TOTAL HIP ARTHROPLASTY Bilateral        Family History   Problem Relation Age of Onset    Hypertension Mother     Mental Illness Mother     Heart Disease Father         Congestive Heart Failure    Alcoholism Father     Mental Illness Brother         Addiction    Kidney Disease Brother     No Known Problems Brother     Mental Illness Sister     Other - See Comments Sister         Heroin adict    No Known Problems Son     No Known Problems Son     Diabetes Other     Cancer Other         Malignant Melanoma Of The Back     Glaucoma No family hx of        Social History     Tobacco Use    Smoking status: Never    Smokeless tobacco: Never   Substance Use Topics    Alcohol use: Yes     Comment: Alcoholic Drinks/day: Rarely    Drug use: No     Comment: Drug use: Previous Marijuana use  "      allopurinol (ZYLOPRIM) 300 MG tablet  aspirin 325 MG tablet  buPROPion (WELLBUTRIN XL) 150 MG 24 hr tablet  cholecalciferol, vitamin D3, 5,000 unit Tab  furosemide (LASIX) 40 MG tablet  gabapentin (NEURONTIN) 100 MG capsule  gabapentin (NEURONTIN) 100 MG capsule  hydrochlorothiazide (HYDRODIURIL) 25 MG tablet  ketorolac (TORADOL) 10 mg tablet  losartan (COZAAR) 100 MG tablet  omeprazole (PRILOSEC) 20 MG capsule  prochlorperazine (COMPAZINE) 25 MG suppository  triamcinolone (KENALOG) 0.025 % cream          Physical Exam     First Vitals:  Patient Vitals for the past 24 hrs:   BP Temp Pulse Resp SpO2 Height Weight   04/03/25 2030 -- -- 88 -- 93 % -- --   04/03/25 2000 -- -- 92 -- 97 % -- --   04/03/25 1930 -- -- 89 -- 97 % -- --   04/03/25 1916 113/72 -- 90 -- 98 % -- --   04/03/25 1825 (!) 149/87 97.1  F (36.2  C) 107 18 98 % 1.753 m (5' 9\") 108.6 kg (239 lb 8 oz)         PHYSICAL EXAM: ***  Physical Exam        Results     LAB:  All pertinent labs reviewed and interpreted  Labs Ordered and Resulted from Time of ED Arrival to Time of ED Departure - No data to display    RADIOLOGY:  No orders to display         ECG:  ***  Performed at: ***    Impression: ***    Rate: ***  Rhythm: ***  Axis: ***  MN Interval: ***  QRS Interval: ***  QTc Interval: ***  ST Changes: ***  Comparison: ***    EKG results reviewed and interpreted by  ***, ED MD.     PROCEDURES:  ***        Mouna Mercer PA-C   Emergency Medicine   [facility] EMERGENCY ROOM    " Laterality Date    ARTHRODESIS WRIST Left 2003    ARTHROPLASTY KNEE      COLONOSCOPY  2016    HC REMOVAL GALLBLADDER      Description: Cholecystectomy;  Recorded: 10/24/2012;    HERNIA REPAIR, UMBILICAL      Herniorrhaphy    OTHER SURGICAL HISTORY Right 09/28/2015    OTHER SURGICAL [id number] lipoma excision from right wrist     [place] ARTHRODESIS ANT INTERBODY MIN DISCECTOMY,LUMBAR      Description: Lumbar Vertebral Fusion;  Proc Date: 04/23/1995;  Comments: L1-L2    [place] SYMPATHECTOMY,LUMBAR      Description: Surgical Sympathectomy Lumbar;  Recorded: 10/24/2012;    TOTAL HIP ARTHROPLASTY Bilateral        Family History   Problem Relation Age of Onset    Hypertension Mother     Mental Illness Mother     Heart Disease Father         Congestive Heart Failure    Alcoholism Father     Mental Illness Brother         Addiction    Kidney Disease Brother     No Known Problems Brother     Mental Illness Sister     Other - See Comments Sister         Heroin adict    No Known Problems Son     No Known Problems Son     Diabetes Other     Cancer Other         Malignant Melanoma Of The Back     Glaucoma No family hx of        Social History     Tobacco Use    Smoking status: Never    Smokeless tobacco: Never   Substance Use Topics    Alcohol use: Yes     Comment: Alcoholic Drinks/day: Rarely    Drug use: No     Comment: Drug use: Previous Marijuana use       allopurinol (ZYLOPRIM) 300 MG tablet  aspirin 325 MG tablet  buPROPion (WELLBUTRIN XL) 150 MG 24 hr tablet  cholecalciferol, vitamin D3, 5,000 unit Tab  furosemide (LASIX) 40 MG tablet  gabapentin (NEURONTIN) 100 MG capsule  gabapentin (NEURONTIN) 100 MG capsule  hydrochlorothiazide (HYDRODIURIL) 25 MG tablet  ketorolac (TORADOL) 10 mg tablet  losartan (COZAAR) 100 MG tablet  omeprazole (PRILOSEC) 20 MG capsule  prochlorperazine (COMPAZINE) 25 MG suppository  triamcinolone (KENALOG) 0.025 % cream          Physical Exam     First Vitals:  Patient Vitals for the past 24 hrs:   BP  "Temp Pulse Resp SpO2 Height Weight   04/03/25 2330 122/76 -- 80 21 98 % -- --   04/03/25 2315 110/70 -- 80 -- 96 % -- --   04/03/25 2300 108/71 -- 81 -- 96 % -- --   04/03/25 2245 117/74 -- 82 -- 95 % -- --   04/03/25 2243 115/67 -- 84 -- 97 % -- --   04/03/25 2115 93/64 -- 84 -- 95 % -- --   04/03/25 2106 117/67 -- 96 -- 98 % -- --   04/03/25 2100 -- -- 84 -- 97 % -- --   04/03/25 2030 -- -- 88 -- 93 % -- --   04/03/25 2000 -- -- 92 -- 97 % -- --   04/03/25 1930 -- -- 89 -- 97 % -- --   04/03/25 1916 113/72 -- 90 -- 98 % -- --   04/03/25 1825 (!) 149/87 97.1  F (36.2  C) 107 18 98 % 1.753 m (5' 9\") 108.6 kg (239 lb 8 oz)         PHYSICAL EXAM:   Physical Exam  Constitutional:       General: He is not in acute distress.     Appearance: Normal appearance. He is not toxic-appearing.   HENT:      Head: Atraumatic.      Right Ear: External ear normal.      Left Ear: External ear normal.      Nose: Nose normal.      Mouth/Throat:      Mouth: Mucous membranes are moist.   Eyes:      General: No scleral icterus.     Extraocular Movements: Extraocular movements intact.      Pupils: Pupils are equal, round, and reactive to light.   Cardiovascular:      Rate and Rhythm: Normal rate and regular rhythm.      Heart sounds: Normal heart sounds.   Pulmonary:      Effort: Pulmonary effort is normal. No respiratory distress.      Breath sounds: Normal breath sounds.   Abdominal:      Palpations: Abdomen is soft.      Tenderness: There is no abdominal tenderness. There is no guarding.   Musculoskeletal:         General: No deformity.      Cervical back: Neck supple.   Skin:     General: Skin is warm.      Capillary Refill: Capillary refill takes less than 2 seconds.      Findings: No erythema.   Neurological:      General: No focal deficit present.      Mental Status: He is alert and oriented to person, place, and time.      GCS: GCS eye subscore is 4. GCS verbal subscore is 5. GCS motor subscore is 6.      Cranial Nerves: Cranial " nerves 2-12 are intact.      Motor: No weakness.      Gait: Gait normal.      Comments: Strength intact in all extremities.   Psychiatric:         Mood and Affect: Mood normal.         Behavior: Behavior normal.             Results     LAB:  All pertinent labs reviewed and interpreted  Labs Ordered and Resulted from Time of ED Arrival to Time of ED Departure   BASIC METABOLIC PANEL - Abnormal       Result Value    Sodium 140      Potassium 4.0      Chloride 101      Carbon Dioxide (CO2) 24      Anion Gap 15      Urea Nitrogen 13.0      Creatinine 0.99      GFR Estimate >90      Calcium 10.1      Glucose 119 (*)    CBC WITH PLATELETS AND DIFFERENTIAL - Abnormal    WBC Count 12.9 (*)     RBC Count 5.83      Hemoglobin 17.1      Hematocrit 49.7      MCV 85      MCH 29.3      MCHC 34.4      RDW 14.6      Platelet Count 214      % Neutrophils 60      % Lymphocytes 23      % Monocytes 9      % Eosinophils 7      % Basophils 1      % Immature Granulocytes 1      NRBCs per 100 WBC 0      Absolute Neutrophils 7.8      Absolute Lymphocytes 2.9      Absolute Monocytes 1.2      Absolute Eosinophils 0.9 (*)     Absolute Basophils 0.1      Absolute Immature Granulocytes 0.1      Absolute NRBCs 0.0     MAGNESIUM - Normal    Magnesium 2.3     TROPONIN T, HIGH SENSITIVITY - Normal    Troponin T, High Sensitivity 7     HEMOGLOBIN A1C - Normal    Estimated Average Glucose 114      Hemoglobin A1C 5.6     GLUCOSE MONITOR NURSING POCT   LIPID PROFILE   CBC WITH PLATELETS   BASIC METABOLIC PANEL   GLUCOSE MONITOR NURSING POCT   GLUCOSE MONITOR NURSING POCT   GLUCOSE MONITOR NURSING POCT   GLUCOSE MONITOR NURSING POCT       RADIOLOGY:  MRA Neck (Carotids) wo & w Contrast   Final Result   IMPRESSION:   HEAD MRI:   1.  Small linear cleft of acute to early subacute ischemic infarction in the left frontal centrum semiovale.   2.  Chronic lacunar infarct right cerebellum.      HEAD MRA:   No stenosis/occlusion, aneurysm, or high flow vascular  malformation.      NECK MRA:   No measurable stenosis or dissection.         MRA Brain ([place]) wo Contrast   Final Result   IMPRESSION:   HEAD MRI:   1.  Small linear cleft of acute to early subacute ischemic infarction in the left frontal centrum semiovale.   2.  Chronic lacunar infarct right cerebellum.      HEAD MRA:   No stenosis/occlusion, aneurysm, or high flow vascular malformation.      NECK MRA:   No measurable stenosis or dissection.         MR Brain w/o & w Contrast   Final Result   IMPRESSION:   HEAD MRI:   1.  Small linear cleft of acute to early subacute ischemic infarction in the left frontal centrum semiovale.   2.  Chronic lacunar infarct right cerebellum.      HEAD MRA:   No stenosis/occlusion, aneurysm, or high flow vascular malformation.      NECK MRA:   No measurable stenosis or dissection.               ECG:  None    PROCEDURES:  None        Mouna Mercer PA-C   Emergency Medicine   [facility] EMERGENCY ROOM       Mouna Mercer PA-C  04/04/25 0017
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
## 2025-04-04 NOTE — PHARMACY-ADMISSION MEDICATION HISTORY
Pharmacist Admission Medication History    Admission medication history is complete. The information provided in this note is only as accurate as the sources available at the time of the update.    Information Source(s): Patient, Family member, and CareEverywhere/SureScripts via phone    Pertinent Information: None    Changes made to PTA medication list:  Added: Zofran, Mounjaro  Deleted: Ketorolac  Changed: Asa, Bupropion, gabapentin,hydrochlorothiazide    Allergies reviewed with patient and updates made in EHR: yes    Medication History Completed By: Brendon Maher [facility] 4/4/2025 8:54 AM    PTA Med List   Medication Sig Last Dose/Taking    allopurinol (ZYLOPRIM) 300 MG tablet Take 1 tablet by mouth daily. 4/3/2025 Morning    aspirin 81 MG EC tablet Take 81 mg by mouth daily. 4/3/2025 Morning    buPROPion (WELLBUTRIN XL) 300 MG 24 hr tablet Take 300 mg by mouth every morning. 4/3/2025 Morning    cholecalciferol, vitamin D3, 5,000 unit Tab [CHOLECALCIFEROL, VITAMIN D3, 5,000 UNIT TAB] Take 5,000 Units by mouth. Past Month    furosemide (LASIX) 40 MG tablet Take 40 mg by mouth daily as needed. Past Month    gabapentin (NEURONTIN) 100 MG capsule Take 1 capsule (100 mg) by mouth every morning. 4/3/2025 Morning    gabapentin (NEURONTIN) 300 MG capsule Take 300 mg by mouth at bedtime. 4/2/2025 Bedtime    hydroCHLOROthiazide 12.5 MG tablet Take 12.5 mg by mouth daily. 4/3/2025 Morning    losartan (COZAAR) 100 MG tablet [LOSARTAN (COZAAR) 100 MG TABLET] Take 1 tablet by mouth once daily 4/3/2025 Morning    omeprazole (PRILOSEC) 20 MG capsule [OMEPRAZOLE (PRILOSEC) 20 MG CAPSULE] Take 1 capsule (20 mg total) by mouth daily. one pill once daily 4/3/2025 Morning    ondansetron (ZOFRAN ODT) 4 MG ODT tab Take 4 mg by mouth every 8 hours as needed for nausea. Past Week    senna-docusate (SENOKOT-S/PERICOLACE) 8.6-50 MG tablet Take 1 tablet by mouth daily. 4/3/2025 Morning    tirzepatide (MOUNJARO) 10 MG/0.5ML SOAJ auto-injector pen  Inject 10 mg subcutaneously once a week. 3/30/2025 Morning

## 2025-04-04 NOTE — PROGRESS NOTES
Occupational Therapy Discharge Summary    Reason for therapy discharge:    All goals and outcomes met, no further needs identified.    Progress towards therapy goal(s). See goals on Care Plan in [facility] electronic health record for goal details.  Goals met    Therapy recommendation(s):    Continued therapy is recommended.  Rationale/Recommendations:  continue OP OT to address TBI symptoms.

## 2025-04-04 NOTE — CONSULTS
CARE MANAGEMENT NOTE:    Care management team consulted for discharge planning. Care manager reviewed patients chart and discussed discharge needs with provider. No discharge needs identified at this time.     No further care management intervention anticipated at this time.  Please re-consult if further needs arise.  Care management signing off.      Mona Guzmán, [facility]  4/4/2025 9:07 AM
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
## 2025-04-04 NOTE — PLAN OF CARE
Physical Therapy Discharge Summary    Reason for therapy discharge:    Discharged to home.    Progress towards therapy goal(s). See goals on Care Plan in [facility] electronic health record for goal details.  Goals not met.  Barriers to achieving goals:   discharge from facility and discharge on same date as initial evaluation.    Therapy recommendation(s):    Continue home exercise program.
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
## 2025-04-04 NOTE — PLAN OF CARE
Pt A&Ox4, able to make needs known. Telle stroke eval done at beginning of shift, see neurology note. RA when awake, some SOB with exertion. Reports headache, PRN tylenol given, not effective, MD aware. Daily medications given. Pt reported headache improving throughout the day. Pt reports constant dull nausea, denies needing any PRN. Worked with therapies this shift. Wife at bedside, attentive to pt. Pt due to void around 1500, bladder scan performed and pt able to void, see flowsheets. MD aware. Plan to discharge home with wife to assist this shift. Left unit with belongings around 1715.    Problem: Stroke, Ischemic (Includes Transient Ischemic Attack)  Goal: Optimal Cognitive Function  Outcome: Progressing  Goal: Improved Communication Skills  Outcome: Progressing  Goal: Effective Oxygenation and Ventilation  Outcome: Progressing  Goal: Improved Sensorimotor Function  Outcome: Progressing  Intervention: Optimize Range of Motion, Motor Control and Function  Recent Flowsheet Documentation  Taken 4/4/2025 1500 by Gayle Borden RN  Positioning/Transfer Devices:   pillows   in use  Taken 4/4/2025 0800 by Gayle Borden, ALISA  Positioning/Transfer Devices:   pillows   in use    Gayle Borden RN on 4/4/2025 at 5:15 PM

## 2025-04-05 ENCOUNTER — ORDERS ONLY (AUTO-RELEASED) (OUTPATIENT)
Dept: INTENSIVE CARE | Facility: CLINIC | Age: 55
End: 2025-04-05
Payer: COMMERCIAL

## 2025-04-05 DIAGNOSIS — I63.9 ACUTE ISCHEMIC STROKE (H): ICD-10-CM

## 2025-04-09 ENCOUNTER — THERAPY VISIT (OUTPATIENT)
Dept: OCCUPATIONAL THERAPY | Facility: REHABILITATION | Age: 55
End: 2025-04-09
Payer: COMMERCIAL

## 2025-04-09 DIAGNOSIS — H53.9 VISUAL DISTURBANCE: Primary | ICD-10-CM

## 2025-04-09 DIAGNOSIS — H83.3X3 SOUND SENSITIVITY IN BOTH EARS: ICD-10-CM

## 2025-04-09 DIAGNOSIS — S06.0XAA CONCUSSION WITH UNKNOWN LOSS OF CONSCIOUSNESS STATUS, INITIAL ENCOUNTER: ICD-10-CM

## 2025-04-09 DIAGNOSIS — H53.143 EYES SENSITIVE TO LIGHT, BILATERAL: ICD-10-CM

## 2025-04-09 DIAGNOSIS — R41.3 MEMORY CHANGES: ICD-10-CM

## 2025-04-09 PROCEDURE — 97112 NEUROMUSCULAR REEDUCATION: CPT | Mod: GO | Performed by: OCCUPATIONAL THERAPIST

## 2025-04-09 PROCEDURE — 97535 SELF CARE MNGMENT TRAINING: CPT | Mod: GO | Performed by: OCCUPATIONAL THERAPIST

## 2025-04-14 ENCOUNTER — THERAPY VISIT (OUTPATIENT)
Dept: SPEECH THERAPY | Facility: REHABILITATION | Age: 55
End: 2025-04-14
Payer: COMMERCIAL

## 2025-04-14 DIAGNOSIS — R41.841 COGNITIVE COMMUNICATION DEFICIT: Primary | ICD-10-CM

## 2025-04-14 PROCEDURE — 92507 TX SP LANG VOICE COMM INDIV: CPT | Mod: GN | Performed by: SPEECH-LANGUAGE PATHOLOGIST

## 2025-04-15 ENCOUNTER — VIRTUAL VISIT (OUTPATIENT)
Dept: PHYSICAL MEDICINE AND REHAB | Facility: CLINIC | Age: 55
End: 2025-04-15
Payer: COMMERCIAL

## 2025-04-15 VITALS — HEIGHT: 69 IN | WEIGHT: 229 LBS | BODY MASS INDEX: 33.92 KG/M2

## 2025-04-15 DIAGNOSIS — S09.90XS HEAD TRAUMA, SEQUELA: Primary | ICD-10-CM

## 2025-04-15 DIAGNOSIS — S06.0XAA CONCUSSION WITH UNKNOWN LOSS OF CONSCIOUSNESS STATUS, INITIAL ENCOUNTER: ICD-10-CM

## 2025-04-15 DIAGNOSIS — H53.2 DOUBLE VISION: ICD-10-CM

## 2025-04-15 DIAGNOSIS — H53.9 VISUAL DISTURBANCE: ICD-10-CM

## 2025-04-15 DIAGNOSIS — R20.9 DISTURBANCE OF SKIN SENSATION: ICD-10-CM

## 2025-04-15 DIAGNOSIS — I63.9 ISCHEMIC STROKE (H): ICD-10-CM

## 2025-04-15 ASSESSMENT — PAIN SCALES - GENERAL: PAINLEVEL_OUTOF10: MILD PAIN (2)

## 2025-04-15 NOTE — PATIENT INSTRUCTIONS
TO DO:  Continue therapies  See Neuro-ophthalmologist due to continued issues with vision  Neurology for stroke and sensation changes   Continue medications
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
## 2025-04-15 NOTE — NURSING NOTE
Current patient location: [address] [place] [id number]    Is the patient currently in the state of [place]? YES    Visit mode: VIDEO    If the visit is dropped, the patient can be reconnected by:VIDEO VISIT: Text to cell phone:   Telephone Information:   Mobile [phone]       Will anyone else be joining the visit? NO  (If patient encounters technical issues they should call [phone]    Are changes needed to the allergy or medication list? Pt stated no changes to allergies and Pt stated no med changes    Are refills needed on medications prescribed by this physician? NO    Rooming Documentation:  Questionnaire(s) completed    Reason for visit: ARMANI Hyde F

## 2025-04-16 ENCOUNTER — THERAPY VISIT (OUTPATIENT)
Dept: OCCUPATIONAL THERAPY | Facility: REHABILITATION | Age: 55
End: 2025-04-16
Payer: COMMERCIAL

## 2025-04-16 ENCOUNTER — TELEPHONE (OUTPATIENT)
Dept: PHYSICAL MEDICINE AND REHAB | Facility: CLINIC | Age: 55
End: 2025-04-16

## 2025-04-16 DIAGNOSIS — H53.143 EYES SENSITIVE TO LIGHT, BILATERAL: ICD-10-CM

## 2025-04-16 DIAGNOSIS — R41.3 MEMORY CHANGES: ICD-10-CM

## 2025-04-16 DIAGNOSIS — H53.9 VISUAL DISTURBANCE: Primary | ICD-10-CM

## 2025-04-16 DIAGNOSIS — S06.0XAA CONCUSSION WITH UNKNOWN LOSS OF CONSCIOUSNESS STATUS, INITIAL ENCOUNTER: ICD-10-CM

## 2025-04-16 DIAGNOSIS — H83.3X3 SOUND SENSITIVITY IN BOTH EARS: ICD-10-CM

## 2025-04-16 NOTE — TELEPHONE ENCOUNTER
----- Message from Elizabeth SERNA sent at 4/16/2025 11:57 AM CDT -----  Good morning,    This patient is needing his MRI referral sent to [facility] in [place]. The patient's insurance will not cover the MRI at [place].     Are you able to have the referral sent?    Thank you  Elizabeth

## 2025-04-16 NOTE — TELEPHONE ENCOUNTER
Patient confirmed scheduled appointment:  Date: 6/12/25  Time: 8am  Visit type: Return Concussion  Provider: Meseret Lyons  Location: Virtual  Testing/imaging: MRI  Additional notes: 2 month follow up    Pt's insurance does not cover MRI at [place]. Pt requested his MRI referral be sent to [facility] in [place]. IB message sent to skylar Tejada on 4/16/2025 at 12:01 PM

## 2025-04-17 ENCOUNTER — TELEPHONE (OUTPATIENT)
Dept: NEUROLOGY | Facility: CLINIC | Age: 55
End: 2025-04-17

## 2025-04-17 ENCOUNTER — TELEPHONE (OUTPATIENT)
Dept: OPHTHALMOLOGY | Facility: CLINIC | Age: 55
End: 2025-04-17

## 2025-04-17 NOTE — TELEPHONE ENCOUNTER
Pt is upset that we are trying to set up his appt with Dr Hall again. He has seen her 2 times before and said he told us he did not want to be schd w/her. Wants to see Dr. Trinidad per his s request.  I told him that Dr SHAIKH does not do Neuro. I said that I would call him back when I got permission for him to see him.   My signature below certifies that the above stated patient is homebound and upon completion of the Face-To-Face encounter, has the need for intermittent skilled nursing, physical therapy and/or speech or occupational therapy services in their home for their current diagnosis as outlined in their initial plan of care. These services will continue to be monitored by myself or another physician.

## 2025-04-17 NOTE — TELEPHONE ENCOUNTER
I called this patient back to talk to him about his needs.  He was not willing to see Dr Hall and wants to see Dr Trinidad for TBI.  Patient was informed that Dr Trinidad does not see patients for TBI and his facilitaor agreed that he can not be scheduled with Viv[facility].  Nora the Neuro-Op facilitator catrachita he can only schedule with Dr Hall.  The patient was told and he was not happy.

## 2025-04-17 NOTE — TELEPHONE ENCOUNTER
Doing chart rewiew. Pt needs a stroke JANE appiontment in addition to the gen neuro appointment he was scheduled for. Maybe he wasn't aware he needs both, please assist

## 2025-04-17 NOTE — TELEPHONE ENCOUNTER
Please offer stroke JANE appt and keep October gen neuro appt as scheduled.       Anastacia Hernandez BS, RN, SCRN  Stroke/Neurovascular Clinic RN  [facility] Neuroscience Service Line

## 2025-04-18 ENCOUNTER — MYC MEDICAL ADVICE (OUTPATIENT)
Dept: PHYSICAL MEDICINE AND REHAB | Facility: CLINIC | Age: 55
End: 2025-04-18
Payer: COMMERCIAL

## 2025-04-21 NOTE — TELEPHONE ENCOUNTER
Called to discuss follow up recommendations and schedule stroke follow up hospital appointment with stroke JANE team in [place] in ADDITION to  general neurology visit. OK to use Return stroke slot if needed.    ALONSO DAVISON CMA

## 2025-04-21 NOTE — TELEPHONE ENCOUNTER
This Request is sent to provider within another request made on 4/21/25    Adriana Delgado RN on 4/21/2025 at 4:01 PM

## 2025-04-25 PROBLEM — Z86.73 HISTORY OF STROKE: Status: ACTIVE | Noted: 2025-04-03
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
## 2025-04-28 ENCOUNTER — THERAPY VISIT (OUTPATIENT)
Dept: SPEECH THERAPY | Facility: REHABILITATION | Age: 55
End: 2025-04-28
Payer: COMMERCIAL

## 2025-04-28 DIAGNOSIS — R41.841 COGNITIVE COMMUNICATION DEFICIT: Primary | ICD-10-CM

## 2025-04-28 PROCEDURE — 92507 TX SP LANG VOICE COMM INDIV: CPT | Mod: GN | Performed by: SPEECH-LANGUAGE PATHOLOGIST

## 2025-04-29 ENCOUNTER — OFFICE VISIT (OUTPATIENT)
Dept: NEUROLOGY | Facility: CLINIC | Age: 55
End: 2025-04-29
Payer: COMMERCIAL

## 2025-04-29 VITALS
BODY MASS INDEX: 35.4 KG/M2 | SYSTOLIC BLOOD PRESSURE: 109 MMHG | DIASTOLIC BLOOD PRESSURE: 79 MMHG | HEART RATE: 101 BPM | WEIGHT: 239.7 LBS

## 2025-04-29 DIAGNOSIS — Z86.73 HISTORY OF STROKE: ICD-10-CM

## 2025-04-29 DIAGNOSIS — M54.12 CERVICAL RADICULOPATHY: Primary | ICD-10-CM

## 2025-04-29 DIAGNOSIS — F07.81 POST CONCUSSION SYNDROME: ICD-10-CM

## 2025-04-29 PROCEDURE — 3078F DIAST BP <80 MM HG: CPT | Performed by: PSYCHIATRY & NEUROLOGY

## 2025-04-29 PROCEDURE — 3074F SYST BP LT 130 MM HG: CPT | Performed by: PSYCHIATRY & NEUROLOGY

## 2025-04-29 PROCEDURE — 99205 OFFICE O/P NEW HI 60 MIN: CPT | Performed by: PSYCHIATRY & NEUROLOGY

## 2025-04-29 PROCEDURE — G2211 COMPLEX E/M VISIT ADD ON: HCPCS | Performed by: PSYCHIATRY & NEUROLOGY

## 2025-04-29 NOTE — NURSING NOTE
Chief Complaint   Patient presents with     Traumatic brain injury     Referred     Stroke     Seen at [facility] ER on 4/3/2025       NESTOR Hong on 4/29/2025 at 11:16 AM
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
## 2025-05-06 ENCOUNTER — THERAPY VISIT (OUTPATIENT)
Dept: OCCUPATIONAL THERAPY | Facility: REHABILITATION | Age: 55
End: 2025-05-06
Payer: COMMERCIAL

## 2025-05-06 DIAGNOSIS — S06.0XAA CONCUSSION WITH UNKNOWN LOSS OF CONSCIOUSNESS STATUS, INITIAL ENCOUNTER: ICD-10-CM

## 2025-05-06 DIAGNOSIS — S09.90XS HEAD TRAUMA, SEQUELA: Primary | ICD-10-CM

## 2025-05-12 ENCOUNTER — THERAPY VISIT (OUTPATIENT)
Dept: SPEECH THERAPY | Facility: REHABILITATION | Age: 55
End: 2025-05-12
Payer: COMMERCIAL

## 2025-05-12 DIAGNOSIS — R41.841 COGNITIVE COMMUNICATION DEFICIT: Primary | ICD-10-CM

## 2025-05-12 PROCEDURE — 92507 TX SP LANG VOICE COMM INDIV: CPT | Mod: GN | Performed by: SPEECH-LANGUAGE PATHOLOGIST

## 2025-05-13 ENCOUNTER — ANCILLARY PROCEDURE (OUTPATIENT)
Dept: NEUROLOGY | Facility: CLINIC | Age: 55
End: 2025-05-13
Attending: PSYCHIATRY & NEUROLOGY
Payer: COMMERCIAL

## 2025-05-13 DIAGNOSIS — F07.81 POST CONCUSSION SYNDROME: ICD-10-CM

## 2025-05-13 PROCEDURE — 95816 EEG AWAKE AND DROWSY: CPT | Performed by: PSYCHIATRY & NEUROLOGY

## 2025-05-14 ENCOUNTER — RESULTS FOLLOW-UP (OUTPATIENT)
Dept: NEUROLOGY | Facility: CLINIC | Age: 55
End: 2025-05-14

## 2025-05-15 ENCOUNTER — RESULTS FOLLOW-UP (OUTPATIENT)
Dept: NEUROLOGY | Facility: CLINIC | Age: 55
End: 2025-05-15

## 2025-05-20 ENCOUNTER — THERAPY VISIT (OUTPATIENT)
Dept: OCCUPATIONAL THERAPY | Facility: REHABILITATION | Age: 55
End: 2025-05-20
Payer: COMMERCIAL

## 2025-05-20 DIAGNOSIS — H53.9 VISUAL DISTURBANCE: Primary | ICD-10-CM

## 2025-05-20 PROCEDURE — 97112 NEUROMUSCULAR REEDUCATION: CPT | Mod: GO | Performed by: OCCUPATIONAL THERAPY ASSISTANT

## 2025-06-04 ENCOUNTER — THERAPY VISIT (OUTPATIENT)
Dept: OCCUPATIONAL THERAPY | Facility: REHABILITATION | Age: 55
End: 2025-06-04
Payer: COMMERCIAL

## 2025-06-04 DIAGNOSIS — H53.143 EYES SENSITIVE TO LIGHT, BILATERAL: Primary | ICD-10-CM

## 2025-06-04 DIAGNOSIS — S06.0XAA CONCUSSION WITH UNKNOWN LOSS OF CONSCIOUSNESS STATUS, INITIAL ENCOUNTER: ICD-10-CM

## 2025-06-04 DIAGNOSIS — R41.3 MEMORY CHANGES: ICD-10-CM

## 2025-06-04 DIAGNOSIS — H53.9 VISION CHANGES: ICD-10-CM

## 2025-06-04 DIAGNOSIS — H83.3X3 SOUND SENSITIVITY IN BOTH EARS: ICD-10-CM

## 2025-06-04 PROCEDURE — 97530 THERAPEUTIC ACTIVITIES: CPT | Mod: GO | Performed by: OCCUPATIONAL THERAPIST

## 2025-06-04 PROCEDURE — 97112 NEUROMUSCULAR REEDUCATION: CPT | Mod: GO | Performed by: OCCUPATIONAL THERAPIST
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
## 2025-06-12 ENCOUNTER — TELEPHONE (OUTPATIENT)
Dept: PHYSICAL MEDICINE AND REHAB | Facility: CLINIC | Age: 55
End: 2025-06-12

## 2025-06-12 ENCOUNTER — VIRTUAL VISIT (OUTPATIENT)
Dept: PHYSICAL MEDICINE AND REHAB | Facility: CLINIC | Age: 55
End: 2025-06-12
Payer: COMMERCIAL

## 2025-06-12 VITALS — WEIGHT: 239 LBS | BODY MASS INDEX: 35.4 KG/M2 | HEIGHT: 69 IN

## 2025-06-12 DIAGNOSIS — H53.9 VISUAL DISTURBANCE: ICD-10-CM

## 2025-06-12 DIAGNOSIS — R20.0 RIGHT FACIAL NUMBNESS: ICD-10-CM

## 2025-06-12 DIAGNOSIS — R20.9 DISTURBANCE OF SKIN SENSATION: ICD-10-CM

## 2025-06-12 DIAGNOSIS — G44.309 POST-CONCUSSION HEADACHE: ICD-10-CM

## 2025-06-12 DIAGNOSIS — M54.2 NECK PAIN: ICD-10-CM

## 2025-06-12 DIAGNOSIS — S06.9XAD MILD TRAUMATIC BRAIN INJURY, WITH UNKNOWN LOSS OF CONSCIOUSNESS STATUS, SUBSEQUENT ENCOUNTER: ICD-10-CM

## 2025-06-12 DIAGNOSIS — H53.2 DOUBLE VISION: Primary | ICD-10-CM

## 2025-06-12 DIAGNOSIS — I63.9 ISCHEMIC STROKE (H): ICD-10-CM

## 2025-06-12 ASSESSMENT — PAIN SCALES - GENERAL: PAINLEVEL_OUTOF10: SEVERE PAIN (7)

## 2025-06-12 NOTE — NURSING NOTE
Current patient location: [address] [place] [id number]    Is the patient currently in the state of [place]? YES    Visit mode: VIDEO    If the visit is dropped, the patient can be reconnected by:VIDEO VISIT: Text to cell phone:   Telephone Information:   Mobile [phone]       Will anyone else be joining the visit? NO  (If patient encounters technical issues they should call [phone])    Are changes needed to the allergy or medication list? No    Are refills needed on medications prescribed by this physician? NO    Rooming Documentation:  Questionnaire(s) completed    Reason for visit: Follow Up    [place] ROD
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
## 2025-06-12 NOTE — LETTER
2025    Barrera Flores   1970      To whom it may concern:  Barrera Flores was seen today, 25, as a follow up in the Concussion Clinic. Patient is under care of the concussion clinic and will need to remain off work until further evaluation. Due to on going neurological and vision symptoms patient will need clearance until he can drive. He has established with rehabilitation, and has been working with neuro-optometry.  Due to continue vision disturbance he has been referred to an outside Neuro-ophthalmology group for further assessment.      Sincerely,      Meseret Bustamante PA-C  Physical Medicine and Rehabilitation and Concussion Clinic  [facility]  Clinic nurse line: [phone]  Fax: [phone]
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
## 2025-06-17 ENCOUNTER — TELEPHONE (OUTPATIENT)
Dept: PHYSICAL MEDICINE AND REHAB | Facility: CLINIC | Age: 55
End: 2025-06-17
Payer: COMMERCIAL

## 2025-06-17 NOTE — TELEPHONE ENCOUNTER
Left Voicemail (2nd Attempt) for the patient to call back and schedule the following:    Appointment type: Return Concussion-Video  Provider: Meseret Lyons  Return date: around 8/12/25  Specialty phone number: [phone]  Additional appointment(s) needed: NA  Additonal Notes: 2 month follow up    Elizabeth Tejada on 6/17/2025 at 8:23 AM

## 2025-07-07 ENCOUNTER — THERAPY VISIT (OUTPATIENT)
Dept: SPEECH THERAPY | Facility: REHABILITATION | Age: 55
End: 2025-07-07
Payer: COMMERCIAL

## 2025-07-07 DIAGNOSIS — R41.841 COGNITIVE COMMUNICATION DEFICIT: Primary | ICD-10-CM

## 2025-07-07 PROCEDURE — 92507 TX SP LANG VOICE COMM INDIV: CPT | Mod: GN | Performed by: SPEECH-LANGUAGE PATHOLOGIST
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
## 2025-07-15 ENCOUNTER — MYC MEDICAL ADVICE (OUTPATIENT)
Dept: PHYSICAL MEDICINE AND REHAB | Facility: CLINIC | Age: 55
End: 2025-07-15

## 2025-07-15 NOTE — LETTER
[facility] PHYSICAL MEDICINE AND REHABILITATION CLINIC [place] [id number]  [address] [facility] [address]  Phone: [phone]  Fax: [phone]    July 15, 2025        Barrera Flores  [address] [facility] [address]        To whom it may concern:    RE: Barrera Flores    To whom it may concern:  Barrera Flores was seen in the Concussion clinic on  06/12/25 as a follow up in the Concussion Clinic. Patient is under care of the concussion clinic and will need to remain off work until  his next visit on 8/12/25.Due to on going neurological and vision symptoms patient will need clearance until he can drive. He has established with rehabilitation, and has been working with neuro-optometry.  Due to continue vision disturbance he has been referred to an outside Neuro-ophthalmology group for further assessment.     Further workability will be provided after next visit on 8/12/25     Sincerely,      Meseret Bustamante PA-C  Physical Medicine and Rehabilitation and Concussion Clinic  [facility]  Clinic nurse line: [phone]  Fax: [phone]

## 2025-07-15 NOTE — TELEPHONE ENCOUNTER
Letter request:     Needs to be off work through next clinic visit on August 12     Routed to provider    Adriana Delgado RN on 7/15/2025 at 1:02 PM

## 2025-07-17 ENCOUNTER — TRANSFERRED RECORDS (OUTPATIENT)
Dept: HEALTH INFORMATION MANAGEMENT | Facility: CLINIC | Age: 55
End: 2025-07-17
Payer: COMMERCIAL

## 2025-07-21 ENCOUNTER — OFFICE VISIT (OUTPATIENT)
Dept: NEUROLOGY | Facility: CLINIC | Age: 55
End: 2025-07-21
Attending: PSYCHIATRY & NEUROLOGY
Payer: COMMERCIAL

## 2025-07-21 VITALS
HEART RATE: 76 BPM | DIASTOLIC BLOOD PRESSURE: 92 MMHG | SYSTOLIC BLOOD PRESSURE: 144 MMHG | BODY MASS INDEX: 32.49 KG/M2 | WEIGHT: 220 LBS | RESPIRATION RATE: 18 BRPM

## 2025-07-21 DIAGNOSIS — Z86.73 HISTORY OF STROKE: ICD-10-CM

## 2025-07-21 DIAGNOSIS — R20.9 DISTURBANCE OF SKIN SENSATION: ICD-10-CM

## 2025-07-21 DIAGNOSIS — M54.12 CERVICAL RADICULOPATHY: ICD-10-CM

## 2025-07-21 DIAGNOSIS — S09.90XS HEAD TRAUMA, SEQUELA: ICD-10-CM

## 2025-07-21 DIAGNOSIS — F07.81 POST CONCUSSION SYNDROME: Primary | ICD-10-CM

## 2025-07-21 DIAGNOSIS — H53.9 VISUAL DISTURBANCE: ICD-10-CM

## 2025-07-21 PROCEDURE — 95886 MUSC TEST DONE W/N TEST COMP: CPT | Mod: LT | Performed by: PSYCHIATRY & NEUROLOGY

## 2025-07-21 PROCEDURE — 3080F DIAST BP >= 90 MM HG: CPT | Performed by: PSYCHIATRY & NEUROLOGY

## 2025-07-21 PROCEDURE — 3077F SYST BP >= 140 MM HG: CPT | Performed by: PSYCHIATRY & NEUROLOGY

## 2025-07-21 PROCEDURE — G2211 COMPLEX E/M VISIT ADD ON: HCPCS | Performed by: PSYCHIATRY & NEUROLOGY

## 2025-07-21 PROCEDURE — 99214 OFFICE O/P EST MOD 30 MIN: CPT | Performed by: PSYCHIATRY & NEUROLOGY

## 2025-07-21 PROCEDURE — 95910 NRV CNDJ TEST 7-8 STUDIES: CPT | Performed by: PSYCHIATRY & NEUROLOGY

## 2025-07-21 RX ORDER — BUPROPION HYDROCHLORIDE 150 MG/1
150 TABLET ORAL EVERY MORNING
COMMUNITY
Start: 2025-06-26

## 2025-07-21 NOTE — NURSING NOTE
Chief Complaint   Patient presents with    Follow Up     Results review     Shira Lauren MA,CMA,10:10 AM

## 2025-07-21 NOTE — PROCEDURES
ELECTROMYOGRAPHY (EMG) REPORT       [facility] NEUROLOGY [place]  [address] [place] [address]  Tel: [phone]  Fax: [phone]  [email or web address]     Barrera Flores,  1970, MRN [id number]  PCP: Chris Jim  Date: 2025     Principal Diagnosis: Cervical radiculopathy     Height: 5 feet 9 inch  Reason for referral: Evaluate left upper. c/o tremors in left arm > 6 months. Diabetic. h/o left arm surgery.      Motor NCS      Nerve / Sites Lat Amp Dist Adis    ms mV cm m/s   L Median - APB      Wrist 4.02 7.9 7       Elbow 8.19 7.9 23 55   L Ulnar - ADM      Wrist 3.00 13.0 7       B.Elbow 5.98 12.9 18.5 62      A.Elbow 8.06 12.4 14 67   L Radial - EIP      Forearm 2.50 3.8        Elbow 4.21 2.7 8 47      Spiral Gr 7.04 2.7 13 46       F  Wave      Nerve Fmin    ms   L Ulnar - ADM 27.86       Sensory NCS      Nerve / Sites Onset Lat Pk Lat Amp.2-3 Dist Adis Lat Diff    ms ms  V cm m/s ms   L Median - II (Antidr)      Wrist 2.42 3.00 23.8 13 54    L Ulnar - V (Antidr)      Wrist 2.19 3.00 10.4 11 50    L Median, Ulnar - Transcarpal comparison      Median Palm 1.56 1.98 38.2 8 51       Ulnar Palm 1.44 1.98 23.8 8 56          0.00   L Radial - Snuff box      Forearm 1.98 2.77 33.4 10 51        EMG Summary Table     Spontaneous MUAP Rcmt Note   Muscle Fib PSW Fasc IA # Amp Dur PPP Rate Type   L. Brachioradialis None None None N N N N N N N   L. Pronator teres None None None N Sl Mod Red Incr Incr N N N   L. Biceps brachii None None None N N N N N N N   L. Deltoid None None None N N N N N N N   L. Triceps brachii None None None N Sl Mod Red Incr Incr N N N   L. Anconeus None None None N Sl Mod Red Incr Incr N N N   L. Extensor digitorum communis None None None N Sl Mod Red Incr Incr N N N   L. Extensor indicis proprius None None None N Sl Mod Red Incr Incr N N N   L. Flexor carpi ulnaris None None None N Sl Mod Red Incr Incr N N N   L. Abductor digiti minimi (manus)  None None None N Sl Mod Red Incr Incr N N N   L. First dorsal interosseous None None None N Sl Mod Red Incr Incr N N N   L. Abductor pollicis brevis None None None N Sl Mod Red Incr Incr N N N        Summary: Nerve conduction and EMG study of left upper extremity shows:  Normal left median, ulnar, radial distal motor latencies, amplitudes and conduction velocities.  Normal left ulnar F latency  Normal left median, ulnar right radial SNAP  Needle exam showed changes as above    Impression:   This is an abnormal nerve conduction and EMG study of left upper extremity that suggests chronic changes in C7 and C8 innervated muscles.  No active denervation was noted.      Corey Hernandez MD  [facility]      This note was dictated using voice recognition software.  Any grammatical or context distortions are unintentional and inherent to the software.

## 2025-07-21 NOTE — LETTER
7/21/2025      Barrera Flores  [address] [facility] [address]      Dear Colleague,    Thank you for referring your patient, Barrera Flores, to the [facility] [place]. Please see a copy of my visit note below.    See procedure note for EMG report    Again, thank you for allowing me to participate in the care of your patient.        Sincerely,        Corey Hernandez MD    Electronically signed
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
## 2025-07-23 ENCOUNTER — TRANSFERRED RECORDS (OUTPATIENT)
Dept: HEALTH INFORMATION MANAGEMENT | Facility: CLINIC | Age: 55
End: 2025-07-23

## 2025-07-24 ENCOUNTER — TELEPHONE (OUTPATIENT)
Dept: OPHTHALMOLOGY | Facility: CLINIC | Age: 55
End: 2025-07-24
Payer: COMMERCIAL

## 2025-07-24 NOTE — TELEPHONE ENCOUNTER
Returned call to patient. Unable to leave VM on his number (no option to leave a message). Will try again at a later time.    Shruti Greco  2:06 PM

## 2025-07-24 NOTE — TELEPHONE ENCOUNTER
The pt is asking for a call back from a manager regarding his service. Please call the pt at [phone]. Thanks.

## 2025-08-11 ENCOUNTER — THERAPY VISIT (OUTPATIENT)
Dept: SPEECH THERAPY | Facility: REHABILITATION | Age: 55
End: 2025-08-11
Payer: COMMERCIAL

## 2025-08-11 DIAGNOSIS — R41.841 COGNITIVE COMMUNICATION DEFICIT: Primary | ICD-10-CM

## 2025-08-11 PROCEDURE — 92507 TX SP LANG VOICE COMM INDIV: CPT | Mod: GN | Performed by: SPEECH-LANGUAGE PATHOLOGIST

## 2025-08-12 ENCOUNTER — VIRTUAL VISIT (OUTPATIENT)
Dept: PHYSICAL MEDICINE AND REHAB | Facility: CLINIC | Age: 55
End: 2025-08-12
Payer: COMMERCIAL

## 2025-08-12 VITALS — BODY MASS INDEX: 32.58 KG/M2 | HEIGHT: 69 IN | WEIGHT: 220 LBS

## 2025-08-12 DIAGNOSIS — G44.309 POST-CONCUSSION HEADACHE: ICD-10-CM

## 2025-08-12 DIAGNOSIS — R20.0 RIGHT FACIAL NUMBNESS: ICD-10-CM

## 2025-08-12 DIAGNOSIS — R20.9 DISTURBANCE OF SKIN SENSATION: ICD-10-CM

## 2025-08-12 DIAGNOSIS — H53.9 VISUAL DISTURBANCE: ICD-10-CM

## 2025-08-12 DIAGNOSIS — H53.2 DOUBLE VISION: ICD-10-CM

## 2025-08-12 DIAGNOSIS — S06.9XAD MILD TRAUMATIC BRAIN INJURY, WITH UNKNOWN LOSS OF CONSCIOUSNESS STATUS, SUBSEQUENT ENCOUNTER: Primary | ICD-10-CM

## 2025-08-12 PROCEDURE — 1125F AMNT PAIN NOTED PAIN PRSNT: CPT | Mod: 95 | Performed by: PHYSICIAN ASSISTANT

## 2025-08-12 PROCEDURE — 98007 SYNCH AUDIO-VIDEO EST HI 40: CPT | Performed by: PHYSICIAN ASSISTANT

## 2025-08-12 ASSESSMENT — PAIN SCALES - GENERAL: PAINLEVEL_OUTOF10: SEVERE PAIN (7)

## 2025-08-13 ENCOUNTER — PATIENT OUTREACH (OUTPATIENT)
Dept: CARE COORDINATION | Facility: CLINIC | Age: 55
End: 2025-08-13
Payer: COMMERCIAL

## 2025-08-15 ENCOUNTER — LAB REQUISITION (OUTPATIENT)
Dept: LAB | Facility: CLINIC | Age: 55
End: 2025-08-15
Payer: COMMERCIAL

## 2025-08-15 DIAGNOSIS — E78.2 MIXED HYPERLIPIDEMIA: ICD-10-CM

## 2025-08-15 LAB
CHOLEST SERPL-MCNC: 145 MG/DL
FASTING STATUS PATIENT QL REPORTED: YES
HDLC SERPL-MCNC: 49 MG/DL
LDLC SERPL CALC-MCNC: 58 MG/DL
NONHDLC SERPL-MCNC: 96 MG/DL
POTASSIUM SERPL-SCNC: 3.9 MMOL/L (ref 3.4–5.3)
TRIGL SERPL-MCNC: 188 MG/DL

## 2025-08-15 PROCEDURE — 80061 LIPID PANEL: CPT | Mod: ORL | Performed by: FAMILY MEDICINE

## 2025-08-15 PROCEDURE — 84132 ASSAY OF SERUM POTASSIUM: CPT | Mod: ORL | Performed by: FAMILY MEDICINE

## 2025-09-02 ENCOUNTER — TRANSFERRED RECORDS (OUTPATIENT)
Dept: HEALTH INFORMATION MANAGEMENT | Facility: CLINIC | Age: 55
End: 2025-09-02
Payer: COMMERCIAL